# Patient Record
Sex: MALE | Employment: UNEMPLOYED | URBAN - METROPOLITAN AREA
[De-identification: names, ages, dates, MRNs, and addresses within clinical notes are randomized per-mention and may not be internally consistent; named-entity substitution may affect disease eponyms.]

---

## 2021-05-29 ENCOUNTER — HOSPITAL ENCOUNTER (INPATIENT)
Age: 51
LOS: 9 days | Discharge: HOME HEALTH | DRG: 420 | End: 2021-06-07
Attending: STUDENT IN AN ORGANIZED HEALTH CARE EDUCATION/TRAINING PROGRAM | Admitting: INTERNAL MEDICINE
Payer: MEDICAID

## 2021-05-29 DIAGNOSIS — F10.10 ALCOHOL ABUSE: ICD-10-CM

## 2021-05-29 DIAGNOSIS — E16.2 HYPOGLYCEMIA: Primary | ICD-10-CM

## 2021-05-29 DIAGNOSIS — T38.3X1S HYPOGLYCEMIA SECONDARY TO SULFONYLUREA, ACCIDENTAL OR UNINTENTIONAL, SEQUELA: ICD-10-CM

## 2021-05-29 DIAGNOSIS — E16.0 HYPOGLYCEMIA SECONDARY TO SULFONYLUREA, ACCIDENTAL OR UNINTENTIONAL, SEQUELA: ICD-10-CM

## 2021-05-29 LAB
ALBUMIN SERPL-MCNC: 3.8 G/DL (ref 3.4–5)
ALP BLD-CCNC: 136 U/L (ref 40–129)
ALT SERPL-CCNC: 15 U/L (ref 10–40)
ANION GAP SERPL CALCULATED.3IONS-SCNC: 15 MMOL/L (ref 3–16)
AST SERPL-CCNC: 41 U/L (ref 15–37)
BASE EXCESS VENOUS: -2 MMOL/L (ref -3–3)
BASOPHILS ABSOLUTE: 0 K/UL (ref 0–0.2)
BASOPHILS RELATIVE PERCENT: 0.3 %
BILIRUB SERPL-MCNC: <0.2 MG/DL (ref 0–1)
BILIRUBIN DIRECT: <0.2 MG/DL (ref 0–0.3)
BILIRUBIN, INDIRECT: ABNORMAL MG/DL (ref 0–1)
BUN BLDV-MCNC: 8 MG/DL (ref 7–20)
CALCIUM SERPL-MCNC: 8.7 MG/DL (ref 8.3–10.6)
CARBOXYHEMOGLOBIN: 2.3 % (ref 0–1.5)
CHLORIDE BLD-SCNC: 100 MMOL/L (ref 99–110)
CO2: 21 MMOL/L (ref 21–32)
CREAT SERPL-MCNC: 0.5 MG/DL (ref 0.9–1.3)
EOSINOPHILS ABSOLUTE: 0 K/UL (ref 0–0.6)
EOSINOPHILS RELATIVE PERCENT: 0.3 %
ETHANOL: NORMAL MG/DL (ref 0–0.08)
GFR AFRICAN AMERICAN: >60
GFR NON-AFRICAN AMERICAN: >60
GLUCOSE BLD-MCNC: 106 MG/DL (ref 70–99)
GLUCOSE BLD-MCNC: 106 MG/DL (ref 70–99)
GLUCOSE BLD-MCNC: 154 MG/DL (ref 70–99)
GLUCOSE BLD-MCNC: 76 MG/DL (ref 70–99)
GLUCOSE BLD-MCNC: 79 MG/DL (ref 70–99)
GLUCOSE BLD-MCNC: 80 MG/DL (ref 70–99)
GLUCOSE BLD-MCNC: 82 MG/DL (ref 70–99)
GLUCOSE BLD-MCNC: 84 MG/DL (ref 70–99)
GLUCOSE BLD-MCNC: 85 MG/DL (ref 70–99)
GLUCOSE BLD-MCNC: 86 MG/DL (ref 70–99)
GLUCOSE BLD-MCNC: 88 MG/DL (ref 70–99)
GLUCOSE BLD-MCNC: 91 MG/DL (ref 70–99)
GLUCOSE BLD-MCNC: 92 MG/DL (ref 70–99)
GLUCOSE BLD-MCNC: 92 MG/DL (ref 70–99)
HCO3 VENOUS: 23.5 MMOL/L (ref 23–29)
HCT VFR BLD CALC: 37.2 % (ref 40.5–52.5)
HEMOGLOBIN: 12.3 G/DL (ref 13.5–17.5)
LYMPHOCYTES ABSOLUTE: 0.8 K/UL (ref 1–5.1)
LYMPHOCYTES RELATIVE PERCENT: 17.8 %
MCH RBC QN AUTO: 33.4 PG (ref 26–34)
MCHC RBC AUTO-ENTMCNC: 33 G/DL (ref 31–36)
MCV RBC AUTO: 101.3 FL (ref 80–100)
METHEMOGLOBIN VENOUS: 0.5 %
MONOCYTES ABSOLUTE: 0.2 K/UL (ref 0–1.3)
MONOCYTES RELATIVE PERCENT: 5 %
NEUTROPHILS ABSOLUTE: 3.6 K/UL (ref 1.7–7.7)
NEUTROPHILS RELATIVE PERCENT: 76.6 %
O2 CONTENT, VEN: 17 VOL %
O2 SAT, VEN: 99 %
O2 THERAPY: ABNORMAL
PCO2, VEN: 42.1 MMHG (ref 40–50)
PDW BLD-RTO: 12.9 % (ref 12.4–15.4)
PERFORMED ON: ABNORMAL
PERFORMED ON: ABNORMAL
PERFORMED ON: NORMAL
PH VENOUS: 7.36 (ref 7.35–7.45)
PLATELET # BLD: 263 K/UL (ref 135–450)
PMV BLD AUTO: 7.2 FL (ref 5–10.5)
PO2, VEN: 146 MMHG (ref 25–40)
POTASSIUM REFLEX MAGNESIUM: 4.6 MMOL/L (ref 3.5–5.1)
RBC # BLD: 3.67 M/UL (ref 4.2–5.9)
SODIUM BLD-SCNC: 136 MMOL/L (ref 136–145)
TCO2 CALC VENOUS: 56 MMOL/L
TOTAL PROTEIN: 7.4 G/DL (ref 6.4–8.2)
WBC # BLD: 4.7 K/UL (ref 4–11)

## 2021-05-29 PROCEDURE — 2580000003 HC RX 258: Performed by: STUDENT IN AN ORGANIZED HEALTH CARE EDUCATION/TRAINING PROGRAM

## 2021-05-29 PROCEDURE — 2580000003 HC RX 258: Performed by: INTERNAL MEDICINE

## 2021-05-29 PROCEDURE — 36415 COLL VENOUS BLD VENIPUNCTURE: CPT

## 2021-05-29 PROCEDURE — 85025 COMPLETE CBC W/AUTO DIFF WBC: CPT

## 2021-05-29 PROCEDURE — 99284 EMERGENCY DEPT VISIT MOD MDM: CPT

## 2021-05-29 PROCEDURE — 82607 VITAMIN B-12: CPT

## 2021-05-29 PROCEDURE — 2500000003 HC RX 250 WO HCPCS: Performed by: STUDENT IN AN ORGANIZED HEALTH CARE EDUCATION/TRAINING PROGRAM

## 2021-05-29 PROCEDURE — 6360000002 HC RX W HCPCS: Performed by: STUDENT IN AN ORGANIZED HEALTH CARE EDUCATION/TRAINING PROGRAM

## 2021-05-29 PROCEDURE — 82803 BLOOD GASES ANY COMBINATION: CPT

## 2021-05-29 PROCEDURE — 2060000000 HC ICU INTERMEDIATE R&B

## 2021-05-29 PROCEDURE — 83036 HEMOGLOBIN GLYCOSYLATED A1C: CPT

## 2021-05-29 PROCEDURE — 80048 BASIC METABOLIC PNL TOTAL CA: CPT

## 2021-05-29 PROCEDURE — 84439 ASSAY OF FREE THYROXINE: CPT

## 2021-05-29 PROCEDURE — 96365 THER/PROPH/DIAG IV INF INIT: CPT

## 2021-05-29 PROCEDURE — 84443 ASSAY THYROID STIM HORMONE: CPT

## 2021-05-29 PROCEDURE — 82077 ASSAY SPEC XCP UR&BREATH IA: CPT

## 2021-05-29 PROCEDURE — 80076 HEPATIC FUNCTION PANEL: CPT

## 2021-05-29 PROCEDURE — 96375 TX/PRO/DX INJ NEW DRUG ADDON: CPT

## 2021-05-29 PROCEDURE — 82746 ASSAY OF FOLIC ACID SERUM: CPT

## 2021-05-29 PROCEDURE — 6360000002 HC RX W HCPCS: Performed by: INTERNAL MEDICINE

## 2021-05-29 PROCEDURE — 96367 TX/PROPH/DG ADDL SEQ IV INF: CPT

## 2021-05-29 PROCEDURE — 96366 THER/PROPH/DIAG IV INF ADDON: CPT

## 2021-05-29 RX ORDER — LORAZEPAM 2 MG/ML
2 INJECTION INTRAMUSCULAR
Status: DISCONTINUED | OUTPATIENT
Start: 2021-05-29 | End: 2021-06-07 | Stop reason: HOSPADM

## 2021-05-29 RX ORDER — LORAZEPAM 2 MG/ML
4 INJECTION INTRAMUSCULAR
Status: DISCONTINUED | OUTPATIENT
Start: 2021-05-29 | End: 2021-06-07 | Stop reason: HOSPADM

## 2021-05-29 RX ORDER — SODIUM CHLORIDE 0.9 % (FLUSH) 0.9 %
5-40 SYRINGE (ML) INJECTION EVERY 12 HOURS SCHEDULED
Status: DISCONTINUED | OUTPATIENT
Start: 2021-05-29 | End: 2021-06-07 | Stop reason: HOSPADM

## 2021-05-29 RX ORDER — LORAZEPAM 1 MG/1
2 TABLET ORAL
Status: DISCONTINUED | OUTPATIENT
Start: 2021-05-29 | End: 2021-06-07 | Stop reason: HOSPADM

## 2021-05-29 RX ORDER — LORAZEPAM 2 MG/ML
3 INJECTION INTRAMUSCULAR
Status: DISCONTINUED | OUTPATIENT
Start: 2021-05-29 | End: 2021-06-07 | Stop reason: HOSPADM

## 2021-05-29 RX ORDER — SODIUM CHLORIDE 0.9 % (FLUSH) 0.9 %
5-40 SYRINGE (ML) INJECTION PRN
Status: DISCONTINUED | OUTPATIENT
Start: 2021-05-29 | End: 2021-06-07 | Stop reason: HOSPADM

## 2021-05-29 RX ORDER — DEXTROSE MONOHYDRATE 50 MG/ML
100 INJECTION, SOLUTION INTRAVENOUS PRN
Status: DISCONTINUED | OUTPATIENT
Start: 2021-05-29 | End: 2021-06-07 | Stop reason: HOSPADM

## 2021-05-29 RX ORDER — DEXTROSE AND SODIUM CHLORIDE 5; .45 G/100ML; G/100ML
INJECTION, SOLUTION INTRAVENOUS CONTINUOUS
Status: DISCONTINUED | OUTPATIENT
Start: 2021-05-29 | End: 2021-05-30

## 2021-05-29 RX ORDER — ACETAMINOPHEN 650 MG/1
650 SUPPOSITORY RECTAL EVERY 6 HOURS PRN
Status: DISCONTINUED | OUTPATIENT
Start: 2021-05-29 | End: 2021-06-07 | Stop reason: HOSPADM

## 2021-05-29 RX ORDER — LORAZEPAM 1 MG/1
3 TABLET ORAL
Status: DISCONTINUED | OUTPATIENT
Start: 2021-05-29 | End: 2021-06-07 | Stop reason: HOSPADM

## 2021-05-29 RX ORDER — LORAZEPAM 2 MG/ML
1 INJECTION INTRAMUSCULAR
Status: DISCONTINUED | OUTPATIENT
Start: 2021-05-29 | End: 2021-06-07 | Stop reason: HOSPADM

## 2021-05-29 RX ORDER — ONDANSETRON 2 MG/ML
4 INJECTION INTRAMUSCULAR; INTRAVENOUS EVERY 6 HOURS PRN
Status: DISCONTINUED | OUTPATIENT
Start: 2021-05-29 | End: 2021-06-07 | Stop reason: HOSPADM

## 2021-05-29 RX ORDER — ACETAMINOPHEN 325 MG/1
650 TABLET ORAL EVERY 6 HOURS PRN
Status: DISCONTINUED | OUTPATIENT
Start: 2021-05-29 | End: 2021-06-07 | Stop reason: HOSPADM

## 2021-05-29 RX ORDER — SODIUM CHLORIDE 9 MG/ML
25 INJECTION, SOLUTION INTRAVENOUS PRN
Status: DISCONTINUED | OUTPATIENT
Start: 2021-05-29 | End: 2021-06-07 | Stop reason: HOSPADM

## 2021-05-29 RX ORDER — LORAZEPAM 1 MG/1
1 TABLET ORAL
Status: DISCONTINUED | OUTPATIENT
Start: 2021-05-29 | End: 2021-06-07 | Stop reason: HOSPADM

## 2021-05-29 RX ORDER — PROMETHAZINE HYDROCHLORIDE 25 MG/1
12.5 TABLET ORAL EVERY 6 HOURS PRN
Status: DISCONTINUED | OUTPATIENT
Start: 2021-05-29 | End: 2021-06-07 | Stop reason: HOSPADM

## 2021-05-29 RX ORDER — DEXTROSE MONOHYDRATE 25 G/50ML
12.5 INJECTION, SOLUTION INTRAVENOUS PRN
Status: DISCONTINUED | OUTPATIENT
Start: 2021-05-29 | End: 2021-06-07 | Stop reason: HOSPADM

## 2021-05-29 RX ORDER — POLYETHYLENE GLYCOL 3350 17 G/17G
17 POWDER, FOR SOLUTION ORAL DAILY PRN
Status: DISCONTINUED | OUTPATIENT
Start: 2021-05-29 | End: 2021-06-07 | Stop reason: HOSPADM

## 2021-05-29 RX ORDER — NICOTINE POLACRILEX 4 MG
15 LOZENGE BUCCAL PRN
Status: DISCONTINUED | OUTPATIENT
Start: 2021-05-29 | End: 2021-06-07 | Stop reason: HOSPADM

## 2021-05-29 RX ORDER — LORAZEPAM 1 MG/1
4 TABLET ORAL
Status: DISCONTINUED | OUTPATIENT
Start: 2021-05-29 | End: 2021-06-07 | Stop reason: HOSPADM

## 2021-05-29 RX ORDER — LISINOPRIL 10 MG/1
20 TABLET ORAL DAILY
Status: DISCONTINUED | OUTPATIENT
Start: 2021-05-29 | End: 2021-05-31

## 2021-05-29 RX ORDER — FOLIC ACID 5 MG/ML
1 INJECTION, SOLUTION INTRAMUSCULAR; INTRAVENOUS; SUBCUTANEOUS ONCE
Status: COMPLETED | OUTPATIENT
Start: 2021-05-29 | End: 2021-05-29

## 2021-05-29 RX ADMIN — FOLIC ACID 1 MG: 5 INJECTION, SOLUTION INTRAMUSCULAR; INTRAVENOUS; SUBCUTANEOUS at 07:03

## 2021-05-29 RX ADMIN — ENOXAPARIN SODIUM 40 MG: 40 INJECTION SUBCUTANEOUS at 17:30

## 2021-05-29 RX ADMIN — THIAMINE HYDROCHLORIDE 100 MG: 100 INJECTION, SOLUTION INTRAMUSCULAR; INTRAVENOUS at 07:07

## 2021-05-29 RX ADMIN — DEXTROSE AND SODIUM CHLORIDE: 5; 450 INJECTION, SOLUTION INTRAVENOUS at 21:54

## 2021-05-29 RX ADMIN — LORAZEPAM 4 MG: 2 INJECTION INTRAMUSCULAR; INTRAVENOUS at 13:30

## 2021-05-29 RX ADMIN — LORAZEPAM 2 MG: 2 INJECTION INTRAMUSCULAR; INTRAVENOUS at 12:50

## 2021-05-29 RX ADMIN — DEXTROSE AND SODIUM CHLORIDE: 5; 450 INJECTION, SOLUTION INTRAVENOUS at 07:27

## 2021-05-29 RX ADMIN — Medication 10 ML: at 21:56

## 2021-05-29 NOTE — ED PROVIDER NOTES
[facility]      Pt Name: Guido Carbajal  MRN: [id number]  [place] 1970  Date of evaluation: 5/29/2021  Provider: Christopher Kauffman MD    CHIEF COMPLAINT       Chief Complaint   Patient presents with    Hypoglycemia     pt brought by EMS with a BS of 38 pt given 1 amp of dextrose and BS is 138. pt alert to self. pt stated he drank alcohol, doesn't remember anything else       History is provided by patient's friend and caretaker  HISTORY OF PRESENT ILLNESS   (Location/Symptom, Timing/Onset, Context/Setting, Quality, Duration, Modifying Factors, Severity)  Note limiting factors. Guido Carbajal is a 45 y.o. male who presents to the emergency department with hypoglycemia. Patient's friend lives with him and states that he slumped over on the couch unconscious around 5:30 AM.  He and his friend had been sitting on the couch watching television around this time. He had 4 beers prior to this. The caretaker gave the patient his diabetic medications  At 11 PM last night, including Metformin and 10 mg of glyburide. She does note that the patient eats very little at baseline. The patient is denying any pain. He is a very poor historian and history is limited otherwise. Patient is on glyburide 10 mg twice daily per care everywhere as of March 31, 2021. Uses Metformin as well. No insulin use. Admitted for hypoglycemia thought to be secondary to glyburide use just 1 month ago. Nursing Notes were reviewed. REVIEW OF SYSTEMS    (2-9 systems for level 4, 10 or more for level 5)     Review of Systems   Unable to perform ROS: Other       Except as noted above the remainder of the review of systems was reviewed and negative. PAST MEDICAL HISTORY     Past Medical History:   Diagnosis Date    Diabetes mellitus ([facility] [address]          SURGICAL HISTORY     No past surgical history on file.       CURRENT MEDICATIONS       Previous Medications    AMITRIPTYLINE (ELAVIL) 100 MG TABLET    Take 100 mg by mouth nightly    FLUOXETINE (PROZAC) 20 MG CAPSULE    Take 20 mg by mouth daily    GABAPENTIN (NEURONTIN) 100 MG CAPSULE    Take 100 mg by mouth 3 times daily    GLYBURIDE PO    Take by mouth    LISINOPRIL (PRINIVIL;ZESTRIL) 20 MG TABLET    Take 20 mg by mouth daily    METFORMIN (GLUCOPHAGE) 1000 MG TABLET    Take 1,000 mg by mouth daily (with breakfast)    NAPROXEN (NAPROSYN) 500 MG TABLET    Take 1 tablet by mouth 2 times daily for 20 doses    PRAVASTATIN (PRAVACHOL) 40 MG TABLET    Take 40 mg by mouth daily       ALLERGIES     Patient has no known allergies. FAMILY HISTORY     No family history on file. SOCIAL HISTORY       Social History     Socioeconomic History    Marital status: Unknown     Spouse name: Not on file    Number of children: Not on file    Years of education: Not on file    Highest education level: Not on file   Occupational History    Not on file   Tobacco Use    Smoking status: Never Smoker   Substance and Sexual Activity    Alcohol use: Yes     Comment: admits to 7-8 beers every day     Drug use: No    Sexual activity: Not on file   Other Topics Concern    Not on file   Social History Narrative    Not on file     Social Determinants of Health     Financial Resource Strain:     Difficulty of Paying Living Expenses:    Food Insecurity:     Worried About Running Out of Food in the Last Year:     [address] in the Last Year:    Transportation Needs:     Lack of Transportation (Medical):      Lack of Transportation (Non-Medical):    Physical Activity:     Days of Exercise per Week:     Minutes of Exercise per Session:    Stress:     Feeling of Stress :    Social Connections:     Frequency of Communication with Friends and Family:     Frequency of Social Gatherings with Friends and Family:     Attends [employer] Services:     Active Member of Clubs or Organizations:     Attends Club or Organization Meetings:     Marital Status:    Intimate Partner Violence:     Fear of Current or Ex-Partner:     Emotionally Abused:     Physically Abused:     Sexually Abused:        SCREENINGS                        PHYSICAL EXAM    (up to 7 for level 4, 8 or more for level 5)     ED Triage Vitals   BP Temp Temp src Pulse Resp SpO2 Height Weight   -- -- -- -- -- -- -- --       Physical Exam  Constitutional:       Appearance: Normal appearance. HENT:      Head: Normocephalic and atraumatic. Eyes:      General:         Right eye: No discharge. Left eye: No discharge. Conjunctiva/sclera: Conjunctivae normal.   Cardiovascular:      Rate and Rhythm: Regular rhythm. Tachycardia present. Heart sounds: Normal heart sounds. No murmur heard. Pulmonary:      Effort: Pulmonary effort is normal. No respiratory distress. Breath sounds: Normal breath sounds. Abdominal:      General: Abdomen is flat. Bowel sounds are normal.      Palpations: Abdomen is soft. Tenderness: There is no abdominal tenderness. Musculoskeletal:         General: No swelling or tenderness. Right lower leg: No edema. Left lower leg: No edema. Skin:     General: Skin is warm. Capillary Refill: Capillary refill takes less than 2 seconds. Comments: Diaphoretic   Neurological:      Mental Status: He is alert.       Comments: Oriented to person and situation, moves all 4 extremities spontaneously   Psychiatric:         Mood and Affect: Mood normal.         Behavior: Behavior normal.         DIAGNOSTIC RESULTS       RADIOLOGY:   Non-plain film images such as CT, Ultrasound and MRI are read by the radiologist. Plain radiographic images are visualized and preliminarily interpreted by the emergency physician with the below findings:        Interpretation per the Radiologist below, if available at the time of this note:    No orders to display         LABS:  Labs Reviewed   CBC WITH AUTO DIFFERENTIAL - Abnormal; Notable for the following components:       Result Value    RBC 3.67 (*)     Hemoglobin 12.3 (*)     Hematocrit 37.2 (*)     .3 (*)     Lymphocytes Absolute 0.8 (*)     All other components within normal limits    Narrative:     Performed at:  [facility] [address], [facility], [address] [employer]   Phone [phone]   BASIC METABOLIC PANEL W/ REFLEX TO MG FOR LOW K - Abnormal; Notable for the following components:    Glucose 106 (*)     CREATININE 0.5 (*)     All other components within normal limits    Narrative:     Performed at:  [facility] [address], [facility], [address]   Phone [phone]   BLOOD GAS, VENOUS - Abnormal; Notable for the following components:    pO2, Zan 146.0 (*)     Carboxyhemoglobin 2.3 (*)     All other components within normal limits    Narrative:     Performed at:  [facility] [address], [facility], [address]   Phone [phone]   POCT GLUCOSE - Abnormal; Notable for the following components:    POC Glucose 154 (*)     All other components within normal limits    Narrative:     Performed at:  [facility] [address], [facility], [address]   Phone [phone]   POCT GLUCOSE - Abnormal; Notable for the following components:    POC Glucose 106 (*)     All other components within normal limits    Narrative:     Performed at:  [facility] [address], [facility], [address] [employer]   Phone [phone]   ETHANOL    Narrative:     Performed at:  [facility] [address], [facility], [address]   Phone [phone]   POCT GLUCOSE    Narrative:     Performed at:  [facility] [address], [facility], [facility] [address]   Phone [phone]   POCT GLUCOSE   POCT GLUCOSE   POCT GLUCOSE   POCT GLUCOSE   POCT GLUCOSE   POCT GLUCOSE   POCT GLUCOSE   POCT GLUCOSE   POCT GLUCOSE   POCT GLUCOSE   POCT GLUCOSE   POCT GLUCOSE   POCT GLUCOSE   POCT GLUCOSE   POCT GLUCOSE       All other labs were within normal range or not returned as of this dictation. EMERGENCY DEPARTMENT COURSE and DIFFERENTIAL DIAGNOSIS/MDM:   Vitals:    Vitals:    05/29/21 0619 05/29/21 0715 05/29/21 0915 05/29/21 0925   BP:  (!) 150/99 (!) 157/106 (!) 157/96   Pulse:  111 118 115   Resp:  8 15    Temp:   98 °F (36.7 °C)    SpO2: 95% 98% 98%        Medications   dextrose 5 % and 0.45 % sodium chloride infusion ( Intravenous New Bag 5/29/21 0727)   sodium chloride flush 0.9 % injection 5-40 mL (has no administration in time range)   LORazepam (ATIVAN) tablet 1 mg (has no administration in time range)     Or   LORazepam (ATIVAN) injection 1 mg (has no administration in time range)     Or   LORazepam (ATIVAN) tablet 2 mg (has no administration in time range)     Or   LORazepam (ATIVAN) injection 2 mg (has no administration in time range)     Or   LORazepam (ATIVAN) tablet 3 mg (has no administration in time range)     Or   LORazepam (ATIVAN) injection 3 mg (has no administration in time range)     Or   LORazepam (ATIVAN) tablet 4 mg (has no administration in time range)     Or   LORazepam (ATIVAN) injection 4 mg (has no administration in time range)   thiamine (B-1) 100 mg in sodium chloride 0.9 % 100 mL IVPB (0 mg Intravenous Stopped 7/08/32 9346)   folic acid injection 1 mg (1 mg Intravenous Given 5/29/21 0703)       Patient is 65-year-old male on sulfonylurea therapy for type 2 diabetes presenting to the emergency room for syncopal episode. Found to be hypoglycemic to the 30s upon EMS arrival.  On arrival to the emergency room his blood glucose is 138 after 1 amp of D50. He is a poor historian and majority of history is given by his friend who lives with him. She believes last sulfonylurea medication was administered between 11 PM and midnight.   Patient apparently drinks heavily

## 2021-05-29 NOTE — H&P
HOSPITALISTS HISTORY AND PHYSICAL    5/29/2021 1:04 PM    Patient Information:  Pepper Butler is a 45 y.o. male [id number]  PCP:  No primary care provider on file. (Tel: None )    Chief complaint:    Chief Complaint   Patient presents with    Hypoglycemia     pt brought by EMS with a BS of 38 pt given 1 amp of dextrose and BS is 138. pt alert to self. pt stated he drank alcohol, doesn't remember anything else       History of Present Illness:  Carmenza Martell is a 45 y.o. male culture when I see the patient unable to give much history. . 83 patient was brought in with a friend as the patient was slumped over unconscious around 5:30 AM.  Patient has been with his friend watching television around this time. Patient had 4 beers prior to this. Patient did take his diabetic medication include Metformin and glyburide last night. Per friend patient does not eat much at baseline. Patient presented to ED was found to be hypoglycemic. Given amp of D5W and remained hypoglycemia started on D5W drip          REVIEW OF SYSTEMS:   Altered mental status unable to answer questions    Past Medical History:   has a past medical history of Diabetes mellitus ([facility] [address] Past Surgical History:   has no past surgical history on file. Medications:  No current facility-administered medications on file prior to encounter.      Current Outpatient Medications on File Prior to Encounter   Medication Sig Dispense Refill    gabapentin (NEURONTIN) 100 MG capsule Take 100 mg by mouth 3 times daily      lisinopril (PRINIVIL;ZESTRIL) 20 MG tablet Take 20 mg by mouth daily      pravastatin (PRAVACHOL) 40 MG tablet Take 40 mg by mouth daily      FLUoxetine (PROZAC) 20 MG capsule Take 20 mg by mouth daily      amitriptyline (ELAVIL) 100 MG tablet Take 100 mg by mouth nightly      metFORMIN (GLUCOPHAGE) 1000 MG encephalopathy: Secondary to above    Dm2: hold meds for now      DVT prophylaxis lovenox  Code status full code        Admit as inpatient I anticipate hospitalization spanning more than two midnights for investigation and treatment of the above medically necessary diagnoses. Please note that some part of this chart was generated using Dragon dictation software. Although every effort was made to ensure the accuracy of this automated transcription, some errors in transcription may have occurred inadvertently. If you may need any clarification, please do not hesitate to contact me through [place] [phone].        Caroline Boone MD    5/29/2021 1:04 PM

## 2021-05-30 ENCOUNTER — APPOINTMENT (OUTPATIENT)
Dept: GENERAL RADIOLOGY | Age: 51
DRG: 420 | End: 2021-05-30
Payer: MEDICAID

## 2021-05-30 LAB
ANION GAP SERPL CALCULATED.3IONS-SCNC: 12 MMOL/L (ref 3–16)
BASOPHILS ABSOLUTE: 0 K/UL (ref 0–0.2)
BASOPHILS RELATIVE PERCENT: 0.2 %
BILIRUBIN URINE: NEGATIVE
BLOOD, URINE: NEGATIVE
BUN BLDV-MCNC: 8 MG/DL (ref 7–20)
CALCIUM SERPL-MCNC: 8.1 MG/DL (ref 8.3–10.6)
CHLORIDE BLD-SCNC: 100 MMOL/L (ref 99–110)
CLARITY: CLEAR
CO2: 23 MMOL/L (ref 21–32)
COLOR: YELLOW
CREAT SERPL-MCNC: 0.6 MG/DL (ref 0.9–1.3)
EOSINOPHILS ABSOLUTE: 0 K/UL (ref 0–0.6)
EOSINOPHILS RELATIVE PERCENT: 0.1 %
ESTIMATED AVERAGE GLUCOSE: 139.9 MG/DL
FERRITIN: 436.2 NG/ML (ref 30–400)
FOLATE: >20 NG/ML (ref 4.78–24.2)
GFR AFRICAN AMERICAN: >60
GFR NON-AFRICAN AMERICAN: >60
GLUCOSE BLD-MCNC: 106 MG/DL (ref 70–99)
GLUCOSE BLD-MCNC: 109 MG/DL (ref 70–99)
GLUCOSE BLD-MCNC: 114 MG/DL (ref 70–99)
GLUCOSE BLD-MCNC: 115 MG/DL (ref 70–99)
GLUCOSE BLD-MCNC: 123 MG/DL (ref 70–99)
GLUCOSE BLD-MCNC: 138 MG/DL (ref 70–99)
GLUCOSE BLD-MCNC: 147 MG/DL (ref 70–99)
GLUCOSE BLD-MCNC: 156 MG/DL (ref 70–99)
GLUCOSE BLD-MCNC: 219 MG/DL (ref 70–99)
GLUCOSE URINE: NEGATIVE MG/DL
HBA1C MFR BLD: 6.5 %
HCT VFR BLD CALC: 34.3 % (ref 40.5–52.5)
HEMOGLOBIN: 11.7 G/DL (ref 13.5–17.5)
IRON SATURATION: 8 % (ref 20–50)
IRON: 25 UG/DL (ref 59–158)
KETONES, URINE: NEGATIVE MG/DL
LACTIC ACID: 3.5 MMOL/L (ref 0.4–2)
LEUKOCYTE ESTERASE, URINE: NEGATIVE
LYMPHOCYTES ABSOLUTE: 1.1 K/UL (ref 1–5.1)
LYMPHOCYTES RELATIVE PERCENT: 5.6 %
MCH RBC QN AUTO: 33.9 PG (ref 26–34)
MCHC RBC AUTO-ENTMCNC: 34.2 G/DL (ref 31–36)
MCV RBC AUTO: 98.9 FL (ref 80–100)
MICROSCOPIC EXAMINATION: NORMAL
MONOCYTES ABSOLUTE: 0.6 K/UL (ref 0–1.3)
MONOCYTES RELATIVE PERCENT: 3 %
NEUTROPHILS ABSOLUTE: 17.9 K/UL (ref 1.7–7.7)
NEUTROPHILS RELATIVE PERCENT: 91.1 %
NITRITE, URINE: NEGATIVE
PDW BLD-RTO: 12.8 % (ref 12.4–15.4)
PERFORMED ON: ABNORMAL
PH UA: 5.5 (ref 5–8)
PLATELET # BLD: 237 K/UL (ref 135–450)
PMV BLD AUTO: 7.6 FL (ref 5–10.5)
POTASSIUM REFLEX MAGNESIUM: 3.9 MMOL/L (ref 3.5–5.1)
PROCALCITONIN: 0.49 NG/ML (ref 0–0.15)
PROTEIN UA: NEGATIVE MG/DL
RBC # BLD: 3.47 M/UL (ref 4.2–5.9)
SODIUM BLD-SCNC: 135 MMOL/L (ref 136–145)
SPECIFIC GRAVITY UA: 1.01 (ref 1–1.03)
T4 FREE: 0.8 NG/DL (ref 0.9–1.8)
TOTAL IRON BINDING CAPACITY: 324 UG/DL (ref 260–445)
TSH REFLEX: 5.89 UIU/ML (ref 0.27–4.2)
URINE REFLEX TO CULTURE: NORMAL
URINE TYPE: NORMAL
UROBILINOGEN, URINE: 0.2 E.U./DL
VITAMIN B-12: 254 PG/ML (ref 211–911)
WBC # BLD: 19.7 K/UL (ref 4–11)

## 2021-05-30 PROCEDURE — 71045 X-RAY EXAM CHEST 1 VIEW: CPT

## 2021-05-30 PROCEDURE — 92610 EVALUATE SWALLOWING FUNCTION: CPT

## 2021-05-30 PROCEDURE — 83540 ASSAY OF IRON: CPT

## 2021-05-30 PROCEDURE — 80048 BASIC METABOLIC PNL TOTAL CA: CPT

## 2021-05-30 PROCEDURE — 81003 URINALYSIS AUTO W/O SCOPE: CPT

## 2021-05-30 PROCEDURE — 2580000003 HC RX 258: Performed by: STUDENT IN AN ORGANIZED HEALTH CARE EDUCATION/TRAINING PROGRAM

## 2021-05-30 PROCEDURE — 2580000003 HC RX 258: Performed by: INTERNAL MEDICINE

## 2021-05-30 PROCEDURE — 2060000000 HC ICU INTERMEDIATE R&B

## 2021-05-30 PROCEDURE — 6360000002 HC RX W HCPCS: Performed by: INTERNAL MEDICINE

## 2021-05-30 PROCEDURE — 92526 ORAL FUNCTION THERAPY: CPT

## 2021-05-30 PROCEDURE — 82728 ASSAY OF FERRITIN: CPT

## 2021-05-30 PROCEDURE — 85025 COMPLETE CBC W/AUTO DIFF WBC: CPT

## 2021-05-30 PROCEDURE — 83605 ASSAY OF LACTIC ACID: CPT

## 2021-05-30 PROCEDURE — 84145 PROCALCITONIN (PCT): CPT

## 2021-05-30 PROCEDURE — 87040 BLOOD CULTURE FOR BACTERIA: CPT

## 2021-05-30 PROCEDURE — 6370000000 HC RX 637 (ALT 250 FOR IP): Performed by: INTERNAL MEDICINE

## 2021-05-30 PROCEDURE — 6360000002 HC RX W HCPCS: Performed by: STUDENT IN AN ORGANIZED HEALTH CARE EDUCATION/TRAINING PROGRAM

## 2021-05-30 PROCEDURE — 2500000003 HC RX 250 WO HCPCS: Performed by: INTERNAL MEDICINE

## 2021-05-30 PROCEDURE — 83550 IRON BINDING TEST: CPT

## 2021-05-30 PROCEDURE — 36415 COLL VENOUS BLD VENIPUNCTURE: CPT

## 2021-05-30 RX ORDER — PAROXETINE HYDROCHLORIDE 20 MG/1
20 TABLET, FILM COATED ORAL EVERY MORNING
Status: ON HOLD | COMMUNITY
End: 2021-06-07 | Stop reason: HOSPADM

## 2021-05-30 RX ORDER — GLIPIZIDE 10 MG/1
10 TABLET ORAL 2 TIMES DAILY
Status: ON HOLD | COMMUNITY
Start: 2021-03-03 | End: 2021-06-07 | Stop reason: HOSPADM

## 2021-05-30 RX ORDER — PRAVASTATIN SODIUM 40 MG
40 TABLET ORAL NIGHTLY
Status: ON HOLD | COMMUNITY
Start: 2021-03-03 | End: 2021-06-07 | Stop reason: HOSPADM

## 2021-05-30 RX ORDER — GABAPENTIN 300 MG/1
1 CAPSULE ORAL 2 TIMES DAILY
Status: ON HOLD | COMMUNITY
Start: 2021-03-03 | End: 2021-06-07 | Stop reason: HOSPADM

## 2021-05-30 RX ORDER — CYANOCOBALAMIN 1000 UG/ML
1000 INJECTION INTRAMUSCULAR; SUBCUTANEOUS ONCE
Status: COMPLETED | OUTPATIENT
Start: 2021-05-30 | End: 2021-05-30

## 2021-05-30 RX ORDER — SODIUM CHLORIDE, SODIUM LACTATE, POTASSIUM CHLORIDE, CALCIUM CHLORIDE 600; 310; 30; 20 MG/100ML; MG/100ML; MG/100ML; MG/100ML
INJECTION, SOLUTION INTRAVENOUS CONTINUOUS
Status: DISCONTINUED | OUTPATIENT
Start: 2021-05-30 | End: 2021-06-01

## 2021-05-30 RX ORDER — AMITRIPTYLINE HYDROCHLORIDE 100 MG/1
100 TABLET, FILM COATED ORAL NIGHTLY
Status: ON HOLD | COMMUNITY
Start: 2021-03-03 | End: 2021-06-07 | Stop reason: HOSPADM

## 2021-05-30 RX ADMIN — LORAZEPAM 4 MG: 2 INJECTION INTRAMUSCULAR; INTRAVENOUS at 12:45

## 2021-05-30 RX ADMIN — CYANOCOBALAMIN 1000 MCG: 1000 INJECTION, SOLUTION INTRAMUSCULAR; SUBCUTANEOUS at 10:08

## 2021-05-30 RX ADMIN — LORAZEPAM 2 MG: 2 INJECTION INTRAMUSCULAR; INTRAVENOUS at 14:25

## 2021-05-30 RX ADMIN — Medication 10 ML: at 21:25

## 2021-05-30 RX ADMIN — SODIUM CHLORIDE 1500 MG: 900 INJECTION INTRAVENOUS at 10:11

## 2021-05-30 RX ADMIN — LORAZEPAM 2 MG: 2 INJECTION INTRAMUSCULAR; INTRAVENOUS at 09:17

## 2021-05-30 RX ADMIN — LORAZEPAM 3 MG: 2 INJECTION INTRAMUSCULAR; INTRAVENOUS at 11:23

## 2021-05-30 RX ADMIN — LISINOPRIL 20 MG: 10 TABLET ORAL at 08:34

## 2021-05-30 RX ADMIN — LORAZEPAM 4 MG: 2 INJECTION INTRAMUSCULAR; INTRAVENOUS at 17:20

## 2021-05-30 RX ADMIN — ENOXAPARIN SODIUM 40 MG: 40 INJECTION SUBCUTANEOUS at 08:34

## 2021-05-30 RX ADMIN — LORAZEPAM 4 MG: 2 INJECTION INTRAMUSCULAR; INTRAVENOUS at 18:31

## 2021-05-30 RX ADMIN — SODIUM CHLORIDE, POTASSIUM CHLORIDE, SODIUM LACTATE AND CALCIUM CHLORIDE: 600; 310; 30; 20 INJECTION, SOLUTION INTRAVENOUS at 15:17

## 2021-05-30 RX ADMIN — ACETAMINOPHEN 650 MG: 325 TABLET ORAL at 17:24

## 2021-05-30 RX ADMIN — SODIUM CHLORIDE 1500 MG: 900 INJECTION INTRAVENOUS at 15:20

## 2021-05-30 RX ADMIN — LORAZEPAM 1 MG: 2 INJECTION INTRAMUSCULAR; INTRAVENOUS at 21:38

## 2021-05-30 RX ADMIN — SODIUM CHLORIDE 1500 MG: 900 INJECTION INTRAVENOUS at 21:32

## 2021-05-30 RX ADMIN — DEXTROSE AND SODIUM CHLORIDE: 5; 450 INJECTION, SOLUTION INTRAVENOUS at 04:37

## 2021-05-30 RX ADMIN — FOLIC ACID: 5 INJECTION, SOLUTION INTRAMUSCULAR; INTRAVENOUS; SUBCUTANEOUS at 10:31

## 2021-05-30 RX ADMIN — Medication 10 ML: at 09:18

## 2021-05-30 ASSESSMENT — PAIN DESCRIPTION - FREQUENCY
FREQUENCY: INTERMITTENT
FREQUENCY: INTERMITTENT

## 2021-05-30 ASSESSMENT — PAIN SCALES - GENERAL
PAINLEVEL_OUTOF10: 6
PAINLEVEL_OUTOF10: 3

## 2021-05-30 ASSESSMENT — PAIN DESCRIPTION - ONSET
ONSET: ON-GOING
ONSET: ON-GOING

## 2021-05-30 ASSESSMENT — PAIN DESCRIPTION - PROGRESSION
CLINICAL_PROGRESSION: NOT CHANGED
CLINICAL_PROGRESSION: NOT CHANGED

## 2021-05-30 ASSESSMENT — PAIN DESCRIPTION - DESCRIPTORS
DESCRIPTORS: ACHING;DULL
DESCRIPTORS: ACHING

## 2021-05-30 ASSESSMENT — PAIN - FUNCTIONAL ASSESSMENT
PAIN_FUNCTIONAL_ASSESSMENT: ACTIVITIES ARE NOT PREVENTED
PAIN_FUNCTIONAL_ASSESSMENT: ACTIVITIES ARE NOT PREVENTED

## 2021-05-30 ASSESSMENT — PAIN DESCRIPTION - LOCATION
LOCATION: HEAD
LOCATION: HEAD

## 2021-05-30 ASSESSMENT — PAIN DESCRIPTION - PAIN TYPE
TYPE: ACUTE PAIN
TYPE: ACUTE PAIN

## 2021-05-30 NOTE — PROGRESS NOTES
Patient was unsure about his current list of medication. Patient provided a cell phone number to call and get medication list. The person was patient's friend Avinash Zuleyka ([phone]). Med rec completed.

## 2021-05-30 NOTE — PROGRESS NOTES
Patient has a visitor present in the room stating that she is patient's roommate. She is demanding medical information about patient. Patient has no emergency contact listed. Informed her that no medical information can be revealed for this reason. She demanded to be added as an Emergency Contact for the patient. Educated her that patient needs to be alert and oriented to consent to that. Visitor visibly upset stating that she has written, legal document stating that she is a legal Emergency Contact for the patient. Visitor provided RN with a document which was a handwritten letter stating that patient had agreed to pay $100 every month for room and board to the visitor. When informed visitor that this is not an acceptable form of legal document to prove that we can release medical information to her, she started crying and yelling loudly. Case management notified but was not able to come to room at this time. Visitor became more upset. Security was called to escort the visitor off the premises.

## 2021-05-30 NOTE — PROGRESS NOTES
Facility/Department: [address]  Initial Assessment  DYSPHAGIA BEDSIDE SWALLOW EVALUATION     Patient: Shanta Martell   : 1970   MRN: [id number]      Evaluation Date: 2021   Admitting Diagnosis: Hypoglycemia [E16.2]  Pain: Pt did not report pain                        H&P: Shanta Martell is a 45 y.o. male culture when I see the patient unable to give much history. . 83 patient was brought in with a friend as the patient was slumped over unconscious around 5:30 AM.  Patient has been with his friend watching television around this time. Patient had 4 beers prior to this. Patient did take his diabetic medication include Metformin and glyburide last night. Per friend patient does not eat much at baseline. Patient presented to ED was found to be hypoglycemic. Given amp of D5W and remained hypoglycemia started on D5W drip    History/Prior Level of Function:   Living Status: Per chart Pt lives with a roommate     Prior Dysphagia History: No dysphagia history reported     Dysphagia Impressions/Diagnosis: Mild-Moderate Oropharyngeal Dysphagia   Pt was seen sitting upright in bed,  services were used as Pt is Kinyarwanda speaking. RN reported no concerns with swallowing. Pt did appear somewhat confused during evaluation with frequent clarification requested by . Pt reported he \"occasionally has difficulty swallowing liquids\" and has missing teeth which contributes to difficulty biting into foods and chewing. Various textures were provided to assess swallow function. Pt presents with mild-moderate oropharyngeal dysphagia characterized by prolonged mastication, suspected premature bolus loss to pharynx, delayed swallow initiation, and reduced laryngeal elevation upon manual palpation. Pt tolerated thin liquids via cup with no overt clinical s/s of aspiration/penetration, however delayed swallow initiation was noted.  Given soft and regular solid trials (eggs and anthony) prolonged mastication was noted with Pt requiring verbal cues to clear anthony from oral cavity. Pt still chewing piece of anthony after 3-4 minutes, verbal cues were required to expel or try to swallowing Pt indicated he could swallow the bolus. Improved and more timely mastication was noted with softer solids. Recommend diet downgrade with ongoing use of aspiration precautions to reduce overall risk. Recommended Diet and Intervention 5/30/2021:  Diet Solids Recommendation:  Dysphagia II Minced and Moist   Liquid Consistency Recommendation: Thin liquids    Recommended form of Meds:  Meds in puree      Compensatory Swallowing Strategies: Alternate solids/liquids , Upright as possible with all PO intake , Assist Feed , Small bites/sips     SHORT TERM DYSPHAGIA GOALS/PLAN OF CARE: Speech therapy for dysphagia tx 3-5 times per week during acute care stay. 1. Pt will functionally tolerate recommended diet with no overt clinical s/s of aspiration  2. Pt will demonstrate understanding of aspiration risk and precautions via education/demonstration with occasional prompting  3. Pt will advance to least restrictive diet as indicated   4.  If clinical s/s of aspiration/penetration continue to be noted, Pt will participate in Modified Barium Swallow Study     Dysphagia Therapeutic Intervention:  Diet Tolerance Monitoring , Patient/Family Education , Therapeutic Trials with SLP     Discharge Recommendations: Recommend ongoing speech therapy for dysphagia therapy upon discharge from hospital    Patient Positioning: Upright in bed    Current Diet Level (prior to evaluation): Regular texture carb control diet/thin liquids    Respiratory Status:   [x]Room Air   []O2 via nasal cannula   []Other:    Dentition:  []Adequate  []Dentures   [x]Missing Many Teeth  []Edentulous  []Other:    Baseline Vocal Quality:  []Normal  []Dysphonic   []Aphonic   []Hoarse  []Wet  [x]Weak  []Other:    Volitional

## 2021-05-30 NOTE — PLAN OF CARE
Problem: Pain:  Goal: Pain level will decrease  Description: Pain level will decrease  Outcome: Ongoing     Problem: Pain:  Goal: Control of acute pain  Description: Control of acute pain  Outcome: Ongoing     Problem: Pain:  Goal: Control of chronic pain  Description: Control of chronic pain  Outcome: Ongoing     Problem: Skin Integrity:  Goal: Will show no infection signs and symptoms  Description: Will show no infection signs and symptoms  5/30/2021 1040 by Cabrera Herrera RN  Outcome: Ongoing     Problem: Falls - Risk of:  Goal: Will remain free from falls  Description: Will remain free from falls  5/30/2021 1040 by Cabrera Herrera RN  Outcome: Ongoing

## 2021-05-30 NOTE — PROGRESS NOTES
Shift assessment complete, Pt still not alert, only opens eyes to voice/agitation. Unable to give PO meds tonight. Pt is here for hypoglycemia. Glucose has been stable on my shift. Q2 hr checks. Pt was given 2mg of ativan then 4mg within 40 minutes of each other in the ED. Pt has been sleeping, and barley opens eyes to voice/agitation, not answering questions. No family at bedside. BP starting to trend up, due to pt not being able to take BP meds, and is tachycardiac, other vitals stable. Unable to complete admission at this time. Pt has been placed in seizure precautions, camera and bed alarm in place. No other concerns, will continue to monitor.

## 2021-05-30 NOTE — PROGRESS NOTES
Pt beginning to wake up, is alert to only self. Has spoken a few words but only in Sami, unable to use the language line with him at this time due to his confusion and drowsiness. Will attempt when pt is more alert.

## 2021-05-30 NOTE — PROGRESS NOTES
Pt's BP trending up. Pt had his scheduled Lisinopril this AM per MAR. Last /92 at 12:40 PM. Pt has no PRN antihypertensive medication. Messaged Hospitalist via [address] [place] [address]. Per Hospitalist, would add agent if Systolic BP at 556J. WCTM.

## 2021-05-30 NOTE — PROGRESS NOTES
[facility]    5/30/2021 12:02 PM        Name: Denzel Card . Admitted: 5/29/2021  Primary Care Provider: No primary care provider on file. (Tel: None)      Subjective:     sepsis overnight white count elevated chest x-ray with likely aspiration pneumonia.   Patient DTs requiring IV Ativan    Reviewed interval ancillary notes    Current Medications  ampicillin-sulbactam (UNASYN) 1500 mg IVPB minibag, Q6H  dextrose 5 % and 0.45 % sodium chloride infusion, Continuous  sodium chloride flush 0.9 % injection 5-40 mL, PRN  LORazepam (ATIVAN) tablet 1 mg, Q1H PRN   Or  LORazepam (ATIVAN) injection 1 mg, Q1H PRN   Or  LORazepam (ATIVAN) tablet 2 mg, Q1H PRN   Or  LORazepam (ATIVAN) injection 2 mg, Q1H PRN   Or  LORazepam (ATIVAN) tablet 3 mg, Q1H PRN   Or  LORazepam (ATIVAN) injection 3 mg, Q1H PRN   Or  LORazepam (ATIVAN) tablet 4 mg, Q1H PRN   Or  LORazepam (ATIVAN) injection 4 mg, Q1H PRN  lisinopril (PRINIVIL;ZESTRIL) tablet 20 mg, Daily  sodium chloride flush 0.9 % injection 5-40 mL, 2 times per day  sodium chloride flush 0.9 % injection 5-40 mL, PRN  0.9 % sodium chloride infusion, PRN  enoxaparin (LOVENOX) injection 40 mg, Daily  promethazine (PHENERGAN) tablet 12.5 mg, Q6H PRN   Or  ondansetron (ZOFRAN) injection 4 mg, Q6H PRN  polyethylene glycol (GLYCOLAX) packet 17 g, Daily PRN  acetaminophen (TYLENOL) tablet 650 mg, Q6H PRN   Or  acetaminophen (TYLENOL) suppository 650 mg, Q6H PRN  sodium chloride 0.9 % 207 mL with folic acid 1 mg, adult multi-vitamin with vitamin k 10 mL, thiamine 100 mg, Daily  glucose (GLUTOSE) 40 % oral gel 15 g, PRN  dextrose 50 % IV solution, PRN  glucagon (rDNA) injection 1 mg, PRN  dextrose 5 % solution, PRN        Objective:  BP (!) 149/88   Pulse 120   Temp 98.6 °F (37 °C) (Axillary)   Resp 16   SpO2 96%     Intake/Output Summary (Last 24 hours) at 5/30/2021 [address] [place] filed at 5/30/2021 1003  Gross per 24 hour   Intake 3824.35 ml   Output 625 ml   Net 3199.35 ml      Wt Readings from Last 3 Encounters:   No data found for Wt       General appearance:  Appears comfortable. AA only oriented to self today  HEENT: atraumatic, Pupils equal, muscous membranes moist, no masses appreciated  Cardiovascular: tachycardic reg rhythm no murmurs appreciated   Respiratory: CTAB no wheezing  Gastrointestinal: Abdomen soft, non-tender, BS+  EXT: no edema  Neurology: no gross focal deficts, bilateral arm tremors  Psychiatry: Appropriate affect. Not agitated  Skin: Warm, dry, no rashes appreciated    Labs and Tests:  CBC:   Recent Labs     05/29/21  0652 05/30/21  0438   WBC 4.7 19.7*   HGB 12.3* 11.7*    237     BMP:    Recent Labs     05/29/21  0652 05/30/21  0438    135*   K 4.6 3.9    100   CO2 21 23   BUN 8 8   CREATININE 0.5* 0.6*   GLUCOSE 106* 114*     Hepatic:   Recent Labs     05/29/21  0652   AST 41*   ALT 15   BILITOT <0.2   ALKPHOS 136*     XR CHEST PORTABLE   Final Result   Bilateral lower lung zone pneumonia more severe on the left             Recent imaging reviewed    Problem List  Active Problems:    Hypoglycemia  Resolved Problems:    * No resolved hospital problems.  *         Assessment/Plan:   Hypoglycemia  - improved stop d5w  - a1c 6.5 dc dm meds likely dc on metformin only    etoh withdrawal  - CIWA scale ativan as needed iv  - banana bag     Sepsis secondary to aspiration pna ( not POA) tachycardia elevated wbc  - start unasyn  - speech on board dysphagia 2 diet    Acute metabolic encephalopathy: Secondary to above           DVT prophylaxis lovenox  Code status full code      Sangeeta Leong MD   5/30/2021 12:02 PM

## 2021-05-31 LAB
ANION GAP SERPL CALCULATED.3IONS-SCNC: 10 MMOL/L (ref 3–16)
BASOPHILS ABSOLUTE: 0 K/UL (ref 0–0.2)
BASOPHILS RELATIVE PERCENT: 0.2 %
BUN BLDV-MCNC: 9 MG/DL (ref 7–20)
CALCIUM SERPL-MCNC: 8.4 MG/DL (ref 8.3–10.6)
CHLORIDE BLD-SCNC: 108 MMOL/L (ref 99–110)
CO2: 27 MMOL/L (ref 21–32)
CREAT SERPL-MCNC: 0.6 MG/DL (ref 0.9–1.3)
EOSINOPHILS ABSOLUTE: 0.1 K/UL (ref 0–0.6)
EOSINOPHILS RELATIVE PERCENT: 0.7 %
GFR AFRICAN AMERICAN: >60
GFR NON-AFRICAN AMERICAN: >60
GLUCOSE BLD-MCNC: 127 MG/DL (ref 70–99)
GLUCOSE BLD-MCNC: 129 MG/DL (ref 70–99)
GLUCOSE BLD-MCNC: 170 MG/DL (ref 70–99)
GLUCOSE BLD-MCNC: 208 MG/DL (ref 70–99)
GLUCOSE BLD-MCNC: 99 MG/DL (ref 70–99)
HCT VFR BLD CALC: 34.5 % (ref 40.5–52.5)
HEMOGLOBIN: 11.8 G/DL (ref 13.5–17.5)
LYMPHOCYTES ABSOLUTE: 0.9 K/UL (ref 1–5.1)
LYMPHOCYTES RELATIVE PERCENT: 6.6 %
MAGNESIUM: 1.4 MG/DL (ref 1.8–2.4)
MCH RBC QN AUTO: 33.8 PG (ref 26–34)
MCHC RBC AUTO-ENTMCNC: 34.2 G/DL (ref 31–36)
MCV RBC AUTO: 98.9 FL (ref 80–100)
MONOCYTES ABSOLUTE: 0.6 K/UL (ref 0–1.3)
MONOCYTES RELATIVE PERCENT: 4.2 %
NEUTROPHILS ABSOLUTE: 12.5 K/UL (ref 1.7–7.7)
NEUTROPHILS RELATIVE PERCENT: 88.3 %
PDW BLD-RTO: 12.9 % (ref 12.4–15.4)
PERFORMED ON: ABNORMAL
PERFORMED ON: NORMAL
PLATELET # BLD: 209 K/UL (ref 135–450)
PMV BLD AUTO: 7.5 FL (ref 5–10.5)
POTASSIUM REFLEX MAGNESIUM: 3.3 MMOL/L (ref 3.5–5.1)
POTASSIUM SERPL-SCNC: 3.4 MMOL/L (ref 3.5–5.1)
RBC # BLD: 3.49 M/UL (ref 4.2–5.9)
SODIUM BLD-SCNC: 145 MMOL/L (ref 136–145)
WBC # BLD: 14.2 K/UL (ref 4–11)

## 2021-05-31 PROCEDURE — 6360000002 HC RX W HCPCS: Performed by: STUDENT IN AN ORGANIZED HEALTH CARE EDUCATION/TRAINING PROGRAM

## 2021-05-31 PROCEDURE — 2500000003 HC RX 250 WO HCPCS: Performed by: INTERNAL MEDICINE

## 2021-05-31 PROCEDURE — 6370000000 HC RX 637 (ALT 250 FOR IP): Performed by: INTERNAL MEDICINE

## 2021-05-31 PROCEDURE — 2060000000 HC ICU INTERMEDIATE R&B

## 2021-05-31 PROCEDURE — 6360000002 HC RX W HCPCS: Performed by: INTERNAL MEDICINE

## 2021-05-31 PROCEDURE — 83735 ASSAY OF MAGNESIUM: CPT

## 2021-05-31 PROCEDURE — 80048 BASIC METABOLIC PNL TOTAL CA: CPT

## 2021-05-31 PROCEDURE — 36415 COLL VENOUS BLD VENIPUNCTURE: CPT

## 2021-05-31 PROCEDURE — 84132 ASSAY OF SERUM POTASSIUM: CPT

## 2021-05-31 PROCEDURE — 85025 COMPLETE CBC W/AUTO DIFF WBC: CPT

## 2021-05-31 PROCEDURE — 2580000003 HC RX 258: Performed by: INTERNAL MEDICINE

## 2021-05-31 RX ORDER — MAGNESIUM SULFATE IN WATER 40 MG/ML
4000 INJECTION, SOLUTION INTRAVENOUS ONCE
Status: COMPLETED | OUTPATIENT
Start: 2021-05-31 | End: 2021-05-31

## 2021-05-31 RX ORDER — LABETALOL HYDROCHLORIDE 5 MG/ML
10 INJECTION, SOLUTION INTRAVENOUS EVERY 6 HOURS PRN
Status: DISCONTINUED | OUTPATIENT
Start: 2021-05-31 | End: 2021-06-07 | Stop reason: HOSPADM

## 2021-05-31 RX ORDER — LANOLIN ALCOHOL/MO/W.PET/CERES
500 CREAM (GRAM) TOPICAL DAILY
Status: DISCONTINUED | OUTPATIENT
Start: 2021-05-31 | End: 2021-06-07 | Stop reason: HOSPADM

## 2021-05-31 RX ORDER — LISINOPRIL 20 MG/1
40 TABLET ORAL DAILY
Status: DISCONTINUED | OUTPATIENT
Start: 2021-06-01 | End: 2021-06-02

## 2021-05-31 RX ORDER — POTASSIUM CHLORIDE 7.45 MG/ML
10 INJECTION INTRAVENOUS
Status: COMPLETED | OUTPATIENT
Start: 2021-05-31 | End: 2021-05-31

## 2021-05-31 RX ORDER — FERROUS SULFATE 325(65) MG
325 TABLET ORAL 2 TIMES DAILY WITH MEALS
Status: DISCONTINUED | OUTPATIENT
Start: 2021-05-31 | End: 2021-06-07 | Stop reason: HOSPADM

## 2021-05-31 RX ADMIN — LORAZEPAM 2 MG: 2 INJECTION INTRAMUSCULAR; INTRAVENOUS at 00:36

## 2021-05-31 RX ADMIN — SODIUM CHLORIDE 1500 MG: 900 INJECTION INTRAVENOUS at 10:11

## 2021-05-31 RX ADMIN — MAGNESIUM SULFATE HEPTAHYDRATE 4000 MG: 40 INJECTION, SOLUTION INTRAVENOUS at 10:56

## 2021-05-31 RX ADMIN — SODIUM CHLORIDE, POTASSIUM CHLORIDE, SODIUM LACTATE AND CALCIUM CHLORIDE: 600; 310; 30; 20 INJECTION, SOLUTION INTRAVENOUS at 00:38

## 2021-05-31 RX ADMIN — LORAZEPAM 3 MG: 2 INJECTION INTRAMUSCULAR; INTRAVENOUS at 23:33

## 2021-05-31 RX ADMIN — POTASSIUM CHLORIDE 10 MEQ: 7.46 INJECTION, SOLUTION INTRAVENOUS at 11:00

## 2021-05-31 RX ADMIN — LORAZEPAM 3 MG: 2 INJECTION INTRAMUSCULAR; INTRAVENOUS at 16:37

## 2021-05-31 RX ADMIN — LABETALOL HYDROCHLORIDE 10 MG: 5 INJECTION INTRAVENOUS at 10:04

## 2021-05-31 RX ADMIN — SODIUM CHLORIDE, POTASSIUM CHLORIDE, SODIUM LACTATE AND CALCIUM CHLORIDE: 600; 310; 30; 20 INJECTION, SOLUTION INTRAVENOUS at 21:42

## 2021-05-31 RX ADMIN — POTASSIUM CHLORIDE 10 MEQ: 7.46 INJECTION, SOLUTION INTRAVENOUS at 13:37

## 2021-05-31 RX ADMIN — POTASSIUM CHLORIDE 10 MEQ: 7.46 INJECTION, SOLUTION INTRAVENOUS at 11:11

## 2021-05-31 RX ADMIN — LABETALOL HYDROCHLORIDE 10 MG: 5 INJECTION INTRAVENOUS at 21:40

## 2021-05-31 RX ADMIN — LORAZEPAM 4 MG: 2 INJECTION INTRAMUSCULAR; INTRAVENOUS at 06:42

## 2021-05-31 RX ADMIN — LORAZEPAM 4 MG: 2 INJECTION INTRAMUSCULAR; INTRAVENOUS at 02:28

## 2021-05-31 RX ADMIN — LORAZEPAM 4 MG: 2 INJECTION INTRAMUSCULAR; INTRAVENOUS at 21:38

## 2021-05-31 RX ADMIN — SODIUM CHLORIDE 1500 MG: 900 INJECTION INTRAVENOUS at 21:37

## 2021-05-31 RX ADMIN — LORAZEPAM 4 MG: 2 INJECTION INTRAMUSCULAR; INTRAVENOUS at 08:48

## 2021-05-31 RX ADMIN — Medication 10 ML: at 08:49

## 2021-05-31 RX ADMIN — LORAZEPAM 3 MG: 2 INJECTION INTRAMUSCULAR; INTRAVENOUS at 13:18

## 2021-05-31 RX ADMIN — SODIUM CHLORIDE 1500 MG: 900 INJECTION INTRAVENOUS at 15:36

## 2021-05-31 RX ADMIN — POTASSIUM CHLORIDE 10 MEQ: 7.46 INJECTION, SOLUTION INTRAVENOUS at 15:33

## 2021-05-31 RX ADMIN — Medication 10 ML: at 21:40

## 2021-05-31 RX ADMIN — FOLIC ACID: 5 INJECTION, SOLUTION INTRAMUSCULAR; INTRAVENOUS; SUBCUTANEOUS at 08:56

## 2021-05-31 RX ADMIN — ENOXAPARIN SODIUM 40 MG: 40 INJECTION SUBCUTANEOUS at 08:49

## 2021-05-31 RX ADMIN — SODIUM CHLORIDE, POTASSIUM CHLORIDE, SODIUM LACTATE AND CALCIUM CHLORIDE: 600; 310; 30; 20 INJECTION, SOLUTION INTRAVENOUS at 08:54

## 2021-05-31 RX ADMIN — SODIUM CHLORIDE 1500 MG: 900 INJECTION INTRAVENOUS at 03:02

## 2021-05-31 NOTE — CARE COORDINATION
CM spoke with Syd Boyce --patient lives with this individual, phone   Her  Tish Hendrickson is [phone] if she cannot be reached please contact him. She inquires about patient status. Update provided to caller.     KATHARINE HyattN, CCM, RN  [facility]  [phone]

## 2021-05-31 NOTE — PROGRESS NOTES
[facility]    5/31/2021 10:41 AM        Name: Dimitry Munoz . Admitted: 5/29/2021  Primary Care Provider: No primary care provider on file.  (Tel: None)      Subjective:    Patient in alcohol withdrawal requiring multiple dose of IV Ativan for elevated CIWA scale currently lethargic after receiving Ativan  Reviewed interval ancillary notes    Current Medications  vitamin B-12 (CYANOCOBALAMIN) tablet 500 mcg, Daily  magnesium sulfate 4000 mg in 100 mL IVPB premix, Once  potassium chloride 10 mEq/100 mL IVPB (Peripheral Line), Q1H  [START ON 6/1/2021] lisinopril (PRINIVIL;ZESTRIL) tablet 40 mg, Daily  labetalol (NORMODYNE;TRANDATE) injection 10 mg, Q6H PRN  ferrous sulfate (IRON 325) tablet 325 mg, BID WC  ampicillin-sulbactam (UNASYN) 1500 mg IVPB minibag, Q6H  lactated ringers infusion, Continuous  sodium chloride flush 0.9 % injection 5-40 mL, PRN  LORazepam (ATIVAN) tablet 1 mg, Q1H PRN   Or  LORazepam (ATIVAN) injection 1 mg, Q1H PRN   Or  LORazepam (ATIVAN) tablet 2 mg, Q1H PRN   Or  LORazepam (ATIVAN) injection 2 mg, Q1H PRN   Or  LORazepam (ATIVAN) tablet 3 mg, Q1H PRN   Or  LORazepam (ATIVAN) injection 3 mg, Q1H PRN   Or  LORazepam (ATIVAN) tablet 4 mg, Q1H PRN   Or  LORazepam (ATIVAN) injection 4 mg, Q1H PRN  sodium chloride flush 0.9 % injection 5-40 mL, 2 times per day  sodium chloride flush 0.9 % injection 5-40 mL, PRN  0.9 % sodium chloride infusion, PRN  enoxaparin (LOVENOX) injection 40 mg, Daily  promethazine (PHENERGAN) tablet 12.5 mg, Q6H PRN   Or  ondansetron (ZOFRAN) injection 4 mg, Q6H PRN  polyethylene glycol (GLYCOLAX) packet 17 g, Daily PRN  acetaminophen (TYLENOL) tablet 650 mg, Q6H PRN   Or  acetaminophen (TYLENOL) suppository 650 mg, Q6H PRN  sodium chloride 0.9 % 204 mL with folic acid 1 mg, adult multi-vitamin with vitamin k 10 mL, thiamine 100 mg, Daily  glucose (GLUTOSE) 40 % oral gel 15 g, PRN  dextrose 50 % IV solution, PRN  glucagon (rDNA) injection 1 mg, PRN  dextrose 5 % solution, PRN        Objective:  BP (!) 172/114   Pulse 112   Temp 97.6 °F (36.4 °C) (Axillary)   Resp 16   Ht 5' 4\" (1.626 m)   Wt 128 lb (58.1 kg)   SpO2 99%   BMI 21.97 kg/m²     Intake/Output Summary (Last 24 hours) at 5/31/2021 1041  Last data filed at 5/31/2021 0655  Gross per 24 hour   Intake 2439.63 ml   Output --   Net 2439.63 ml      Wt Readings from Last 3 Encounters:   05/31/21 128 lb (58.1 kg)       General appearance:  Appears comfortable. lying in bed sleeping  HEENT: atraumatic, Pupils equal, muscous membranes moist, no masses appreciated  Cardiovascular: tachycardic reg rhythm no murmurs appreciated   Respiratory: CTAB no wheezing  Gastrointestinal: Abdomen soft, non-tender, BS+  EXT: no edema  Neurology: no gross focal deficts,   Psychiatry: Appropriate affect. Not agitated  Skin: Warm, dry, no rashes appreciated    Labs and Tests:  CBC:   Recent Labs     05/29/21  0652 05/30/21  0438 05/31/21  0432   WBC 4.7 19.7* 14.2*   HGB 12.3* 11.7* 11.8*    237 209     BMP:    Recent Labs     05/29/21  0652 05/30/21  0438 05/31/21  0432    135* 145   K 4.6 3.9 3.3*    100 108   CO2 21 23 27   BUN 8 8 9   CREATININE 0.5* 0.6* 0.6*   GLUCOSE 106* 114* 129*     Hepatic:   Recent Labs     05/29/21  0652   AST 41*   ALT 15   BILITOT <0.2   ALKPHOS 136*     XR CHEST PORTABLE   Final Result   Bilateral lower lung zone pneumonia more severe on the left             Recent imaging reviewed    Problem List  Active Problems:    Hypoglycemia  Resolved Problems:    * No resolved hospital problems.  *         Assessment/Plan:   Hypoglycemia  - improved   - a1c 6.5 dc dm meds likely dc on metformin only  - monitor    Sepsis secondary to aspiration pna ( not POA) tachycardia elevated wbc  unasyn  - speech on board dysphagia 2 diet    etoh withdrawal  - CIWA scale ,requring multiple doses of ativan monitor closely if needed transfer to icu and phenobarbital  - banana bag       Acute metabolic encephalopathy: Secondary to above      hypokalemia and hypomagnsemia replace and recheck    Iron and b12 deficiency anemia : started on replacement will need outpatient colonscopy    Htn: prn labetolol and lisinpril to 40mg     DVT prophylaxis lovenox  Code status full code      Noni Wolfe MD   5/31/2021 10:41 AM

## 2021-05-31 NOTE — PLAN OF CARE
Problem: Pain:  Goal: Pain level will decrease  Description: Pain level will decrease  Outcome: Ongoing     Problem: Skin Integrity:  Goal: Will show no infection signs and symptoms  Description: Will show no infection signs and symptoms  Outcome: Ongoing     Problem: Falls - Risk of:  Goal: Will remain free from falls  Description: Will remain free from falls  Outcome: Ongoing  Goal: Absence of physical injury  Description: Absence of physical injury  Outcome: Ongoing   Pt medicated per [facility] and showed no S/S of pain. Pt skin is kept as dry and clean as possible with changing as needed. Pt remains free of falls and injury. WCTM.

## 2021-06-01 ENCOUNTER — APPOINTMENT (OUTPATIENT)
Dept: CT IMAGING | Age: 51
DRG: 420 | End: 2021-06-01
Payer: MEDICAID

## 2021-06-01 ENCOUNTER — APPOINTMENT (OUTPATIENT)
Dept: GENERAL RADIOLOGY | Age: 51
DRG: 420 | End: 2021-06-01
Payer: MEDICAID

## 2021-06-01 PROBLEM — F10.939 ALCOHOL WITHDRAWAL SYNDROME WITH COMPLICATION (HCC): Status: ACTIVE | Noted: 2021-06-01

## 2021-06-01 PROBLEM — E11.59 HYPERTENSION ASSOCIATED WITH DIABETES (HCC): Status: ACTIVE | Noted: 2021-06-01

## 2021-06-01 PROBLEM — I15.2 HYPERTENSION ASSOCIATED WITH DIABETES (HCC): Status: ACTIVE | Noted: 2021-06-01

## 2021-06-01 PROBLEM — F32.A ANXIETY AND DEPRESSION: Status: ACTIVE | Noted: 2021-06-01

## 2021-06-01 PROBLEM — E11.42 DIABETIC PERIPHERAL NEUROPATHY (HCC): Status: ACTIVE | Noted: 2021-06-01

## 2021-06-01 PROBLEM — I10 ESSENTIAL HYPERTENSION: Status: ACTIVE | Noted: 2021-06-01

## 2021-06-01 PROBLEM — E11.9 DIABETES MELLITUS TYPE 2 IN NONOBESE (HCC): Status: ACTIVE | Noted: 2021-06-01

## 2021-06-01 PROBLEM — J18.9 BILATERAL PNEUMONIA: Status: ACTIVE | Noted: 2021-06-01

## 2021-06-01 PROBLEM — E78.5 HYPERLIPIDEMIA ASSOCIATED WITH TYPE 2 DIABETES MELLITUS (HCC): Status: ACTIVE | Noted: 2021-06-01

## 2021-06-01 PROBLEM — F41.9 ANXIETY AND DEPRESSION: Status: ACTIVE | Noted: 2021-06-01

## 2021-06-01 PROBLEM — R40.4 TRANSIENT ALTERATION OF AWARENESS: Status: ACTIVE | Noted: 2021-06-01

## 2021-06-01 PROBLEM — E03.9 ACQUIRED HYPOTHYROIDISM: Status: ACTIVE | Noted: 2021-06-01

## 2021-06-01 PROBLEM — E11.69 HYPERLIPIDEMIA ASSOCIATED WITH TYPE 2 DIABETES MELLITUS (HCC): Status: ACTIVE | Noted: 2021-06-01

## 2021-06-01 LAB
AMPHETAMINE SCREEN, URINE: NORMAL
ANION GAP SERPL CALCULATED.3IONS-SCNC: 13 MMOL/L (ref 3–16)
BARBITURATE SCREEN URINE: NORMAL
BASOPHILS ABSOLUTE: 0 K/UL (ref 0–0.2)
BASOPHILS RELATIVE PERCENT: 0.2 %
BENZODIAZEPINE SCREEN, URINE: NORMAL
BUN BLDV-MCNC: 6 MG/DL (ref 7–20)
CALCIUM SERPL-MCNC: 8.5 MG/DL (ref 8.3–10.6)
CANNABINOID SCREEN URINE: NORMAL
CHLORIDE BLD-SCNC: 105 MMOL/L (ref 99–110)
CO2: 28 MMOL/L (ref 21–32)
COCAINE METABOLITE SCREEN URINE: NORMAL
CREAT SERPL-MCNC: <0.5 MG/DL (ref 0.9–1.3)
EOSINOPHILS ABSOLUTE: 0.1 K/UL (ref 0–0.6)
EOSINOPHILS RELATIVE PERCENT: 1.4 %
GFR AFRICAN AMERICAN: >60
GFR NON-AFRICAN AMERICAN: >60
GLUCOSE BLD-MCNC: 107 MG/DL (ref 70–99)
GLUCOSE BLD-MCNC: 139 MG/DL (ref 70–99)
GLUCOSE BLD-MCNC: 158 MG/DL (ref 70–99)
GLUCOSE BLD-MCNC: 159 MG/DL (ref 70–99)
GLUCOSE BLD-MCNC: 173 MG/DL (ref 70–99)
GLUCOSE BLD-MCNC: 89 MG/DL (ref 70–99)
HCT VFR BLD CALC: 32.6 % (ref 40.5–52.5)
HEMOGLOBIN: 11 G/DL (ref 13.5–17.5)
LACTIC ACID: 1.4 MMOL/L (ref 0.4–2)
LYMPHOCYTES ABSOLUTE: 1.2 K/UL (ref 1–5.1)
LYMPHOCYTES RELATIVE PERCENT: 12.3 %
Lab: NORMAL
MAGNESIUM: 1.8 MG/DL (ref 1.8–2.4)
MCH RBC QN AUTO: 33.5 PG (ref 26–34)
MCHC RBC AUTO-ENTMCNC: 33.8 G/DL (ref 31–36)
MCV RBC AUTO: 99.1 FL (ref 80–100)
METHADONE SCREEN, URINE: NORMAL
MONOCYTES ABSOLUTE: 0.6 K/UL (ref 0–1.3)
MONOCYTES RELATIVE PERCENT: 6.4 %
NEUTROPHILS ABSOLUTE: 7.7 K/UL (ref 1.7–7.7)
NEUTROPHILS RELATIVE PERCENT: 79.7 %
OPIATE SCREEN URINE: NORMAL
OXYCODONE URINE: NORMAL
PDW BLD-RTO: 12.8 % (ref 12.4–15.4)
PERFORMED ON: ABNORMAL
PERFORMED ON: NORMAL
PH UA: 7
PHENCYCLIDINE SCREEN URINE: NORMAL
PLATELET # BLD: 236 K/UL (ref 135–450)
PLATELET SLIDE REVIEW: ADEQUATE
PMV BLD AUTO: 8 FL (ref 5–10.5)
POTASSIUM REFLEX MAGNESIUM: 3.5 MMOL/L (ref 3.5–5.1)
PROCALCITONIN: 0.19 NG/ML (ref 0–0.15)
PROPOXYPHENE SCREEN: NORMAL
RBC # BLD: 3.3 M/UL (ref 4.2–5.9)
SLIDE REVIEW: ABNORMAL
SODIUM BLD-SCNC: 146 MMOL/L (ref 136–145)
WBC # BLD: 9.7 K/UL (ref 4–11)

## 2021-06-01 PROCEDURE — 95819 EEG AWAKE AND ASLEEP: CPT

## 2021-06-01 PROCEDURE — 83605 ASSAY OF LACTIC ACID: CPT

## 2021-06-01 PROCEDURE — 92526 ORAL FUNCTION THERAPY: CPT

## 2021-06-01 PROCEDURE — 2580000003 HC RX 258: Performed by: FAMILY MEDICINE

## 2021-06-01 PROCEDURE — 6370000000 HC RX 637 (ALT 250 FOR IP): Performed by: FAMILY MEDICINE

## 2021-06-01 PROCEDURE — 84145 PROCALCITONIN (PCT): CPT

## 2021-06-01 PROCEDURE — 2580000003 HC RX 258: Performed by: STUDENT IN AN ORGANIZED HEALTH CARE EDUCATION/TRAINING PROGRAM

## 2021-06-01 PROCEDURE — 2500000003 HC RX 250 WO HCPCS: Performed by: FAMILY MEDICINE

## 2021-06-01 PROCEDURE — 83735 ASSAY OF MAGNESIUM: CPT

## 2021-06-01 PROCEDURE — 87449 NOS EACH ORGANISM AG IA: CPT

## 2021-06-01 PROCEDURE — 0202U NFCT DS 22 TRGT SARS-COV-2: CPT

## 2021-06-01 PROCEDURE — 6360000002 HC RX W HCPCS: Performed by: STUDENT IN AN ORGANIZED HEALTH CARE EDUCATION/TRAINING PROGRAM

## 2021-06-01 PROCEDURE — 6370000000 HC RX 637 (ALT 250 FOR IP): Performed by: INTERNAL MEDICINE

## 2021-06-01 PROCEDURE — 2060000000 HC ICU INTERMEDIATE R&B

## 2021-06-01 PROCEDURE — 85025 COMPLETE CBC W/AUTO DIFF WBC: CPT

## 2021-06-01 PROCEDURE — 6360000002 HC RX W HCPCS: Performed by: FAMILY MEDICINE

## 2021-06-01 PROCEDURE — 80048 BASIC METABOLIC PNL TOTAL CA: CPT

## 2021-06-01 PROCEDURE — 95816 EEG AWAKE AND DROWSY: CPT | Performed by: PSYCHIATRY & NEUROLOGY

## 2021-06-01 PROCEDURE — 2500000003 HC RX 250 WO HCPCS: Performed by: INTERNAL MEDICINE

## 2021-06-01 PROCEDURE — 36415 COLL VENOUS BLD VENIPUNCTURE: CPT

## 2021-06-01 PROCEDURE — 71045 X-RAY EXAM CHEST 1 VIEW: CPT

## 2021-06-01 PROCEDURE — 80307 DRUG TEST PRSMV CHEM ANLYZR: CPT

## 2021-06-01 PROCEDURE — 2580000003 HC RX 258: Performed by: INTERNAL MEDICINE

## 2021-06-01 PROCEDURE — 6360000002 HC RX W HCPCS: Performed by: INTERNAL MEDICINE

## 2021-06-01 RX ORDER — CLONIDINE HYDROCHLORIDE 0.1 MG/1
0.2 TABLET ORAL ONCE
Status: COMPLETED | OUTPATIENT
Start: 2021-06-01 | End: 2021-06-01

## 2021-06-01 RX ORDER — LEVOTHYROXINE SODIUM 0.03 MG/1
50 TABLET ORAL
Status: DISCONTINUED | OUTPATIENT
Start: 2021-06-02 | End: 2021-06-07 | Stop reason: HOSPADM

## 2021-06-01 RX ORDER — FOLIC ACID 1 MG/1
1 TABLET ORAL DAILY
Status: DISCONTINUED | OUTPATIENT
Start: 2021-06-01 | End: 2021-06-01

## 2021-06-01 RX ORDER — GAUZE BANDAGE 2" X 2"
100 BANDAGE TOPICAL DAILY
Status: DISCONTINUED | OUTPATIENT
Start: 2021-06-02 | End: 2021-06-01

## 2021-06-01 RX ORDER — CLONIDINE HYDROCHLORIDE 0.1 MG/1
0.2 TABLET ORAL EVERY 8 HOURS PRN
Status: DISCONTINUED | OUTPATIENT
Start: 2021-06-01 | End: 2021-06-01

## 2021-06-01 RX ORDER — AMITRIPTYLINE HYDROCHLORIDE 50 MG/1
50 TABLET, FILM COATED ORAL NIGHTLY
Status: DISCONTINUED | OUTPATIENT
Start: 2021-06-01 | End: 2021-06-07 | Stop reason: HOSPADM

## 2021-06-01 RX ORDER — CLONIDINE HYDROCHLORIDE 0.1 MG/1
0.2 TABLET ORAL EVERY 6 HOURS PRN
Status: DISCONTINUED | OUTPATIENT
Start: 2021-06-01 | End: 2021-06-07 | Stop reason: HOSPADM

## 2021-06-01 RX ORDER — THIAMINE HYDROCHLORIDE 100 MG/ML
100 INJECTION, SOLUTION INTRAMUSCULAR; INTRAVENOUS DAILY
Status: DISCONTINUED | OUTPATIENT
Start: 2021-06-02 | End: 2021-06-06

## 2021-06-01 RX ORDER — CARVEDILOL 6.25 MG/1
12.5 TABLET ORAL 2 TIMES DAILY WITH MEALS
Status: DISCONTINUED | OUTPATIENT
Start: 2021-06-01 | End: 2021-06-02

## 2021-06-01 RX ORDER — FLUOXETINE HYDROCHLORIDE 20 MG/1
20 CAPSULE ORAL DAILY
Status: DISCONTINUED | OUTPATIENT
Start: 2021-06-01 | End: 2021-06-07 | Stop reason: HOSPADM

## 2021-06-01 RX ORDER — PRAVASTATIN SODIUM 40 MG
40 TABLET ORAL DAILY
Status: DISCONTINUED | OUTPATIENT
Start: 2021-06-01 | End: 2021-06-07 | Stop reason: HOSPADM

## 2021-06-01 RX ORDER — CHLORDIAZEPOXIDE HYDROCHLORIDE 25 MG/1
50 CAPSULE, GELATIN COATED ORAL EVERY 6 HOURS
Status: DISCONTINUED | OUTPATIENT
Start: 2021-06-01 | End: 2021-06-02

## 2021-06-01 RX ORDER — GABAPENTIN 100 MG/1
100 CAPSULE ORAL 3 TIMES DAILY
Status: DISCONTINUED | OUTPATIENT
Start: 2021-06-01 | End: 2021-06-07 | Stop reason: HOSPADM

## 2021-06-01 RX ORDER — CHLORDIAZEPOXIDE HYDROCHLORIDE 25 MG/1
25 CAPSULE, GELATIN COATED ORAL ONCE
Status: COMPLETED | OUTPATIENT
Start: 2021-06-01 | End: 2021-06-01

## 2021-06-01 RX ORDER — POTASSIUM CHLORIDE AND SODIUM CHLORIDE 450; 150 MG/100ML; MG/100ML
INJECTION, SOLUTION INTRAVENOUS CONTINUOUS
Status: DISCONTINUED | OUTPATIENT
Start: 2021-06-01 | End: 2021-06-05

## 2021-06-01 RX ORDER — CHLORDIAZEPOXIDE HYDROCHLORIDE 25 MG/1
25 CAPSULE, GELATIN COATED ORAL EVERY 6 HOURS
Status: DISCONTINUED | OUTPATIENT
Start: 2021-06-01 | End: 2021-06-01

## 2021-06-01 RX ORDER — CARVEDILOL 6.25 MG/1
6.25 TABLET ORAL 2 TIMES DAILY WITH MEALS
Status: DISCONTINUED | OUTPATIENT
Start: 2021-06-01 | End: 2021-06-01

## 2021-06-01 RX ORDER — FOLIC ACID 1 MG/1
1 TABLET ORAL DAILY
Status: DISCONTINUED | OUTPATIENT
Start: 2021-06-02 | End: 2021-06-07 | Stop reason: HOSPADM

## 2021-06-01 RX ORDER — THIAMINE HYDROCHLORIDE 100 MG/ML
100 INJECTION, SOLUTION INTRAMUSCULAR; INTRAVENOUS DAILY
Status: DISCONTINUED | OUTPATIENT
Start: 2021-06-01 | End: 2021-06-01

## 2021-06-01 RX ORDER — INSULIN LISPRO 100 [IU]/ML
0-12 INJECTION, SOLUTION INTRAVENOUS; SUBCUTANEOUS EVERY 6 HOURS
Status: DISCONTINUED | OUTPATIENT
Start: 2021-06-01 | End: 2021-06-04

## 2021-06-01 RX ADMIN — Medication 10 ML: at 23:15

## 2021-06-01 RX ADMIN — SODIUM CHLORIDE 1500 MG: 900 INJECTION INTRAVENOUS at 15:36

## 2021-06-01 RX ADMIN — Medication 10 ML: at 20:14

## 2021-06-01 RX ADMIN — LABETALOL HYDROCHLORIDE 10 MG: 5 INJECTION INTRAVENOUS at 04:19

## 2021-06-01 RX ADMIN — SODIUM CHLORIDE, POTASSIUM CHLORIDE, SODIUM LACTATE AND CALCIUM CHLORIDE: 600; 310; 30; 20 INJECTION, SOLUTION INTRAVENOUS at 07:45

## 2021-06-01 RX ADMIN — Medication 10 ML: at 09:15

## 2021-06-01 RX ADMIN — FOLIC ACID: 5 INJECTION, SOLUTION INTRAMUSCULAR; INTRAVENOUS; SUBCUTANEOUS at 11:07

## 2021-06-01 RX ADMIN — LABETALOL HYDROCHLORIDE 10 MG: 5 INJECTION INTRAVENOUS at 23:15

## 2021-06-01 RX ADMIN — SODIUM CHLORIDE 1500 MG: 900 INJECTION INTRAVENOUS at 03:12

## 2021-06-01 RX ADMIN — SODIUM CHLORIDE 1500 MG: 900 INJECTION INTRAVENOUS at 09:19

## 2021-06-01 RX ADMIN — CLONIDINE HYDROCHLORIDE 0.2 MG: 0.1 TABLET ORAL at 14:27

## 2021-06-01 RX ADMIN — LORAZEPAM 4 MG: 2 INJECTION INTRAMUSCULAR; INTRAVENOUS at 03:04

## 2021-06-01 RX ADMIN — LORAZEPAM 4 MG: 2 INJECTION INTRAMUSCULAR; INTRAVENOUS at 14:14

## 2021-06-01 RX ADMIN — LORAZEPAM 3 MG: 2 INJECTION INTRAMUSCULAR; INTRAVENOUS at 15:32

## 2021-06-01 RX ADMIN — GABAPENTIN 100 MG: 100 CAPSULE ORAL at 14:27

## 2021-06-01 RX ADMIN — Medication 10 ML: at 22:10

## 2021-06-01 RX ADMIN — ENOXAPARIN SODIUM 40 MG: 40 INJECTION SUBCUTANEOUS at 09:14

## 2021-06-01 RX ADMIN — INSULIN LISPRO 2 UNITS: 100 INJECTION, SOLUTION INTRAVENOUS; SUBCUTANEOUS at 14:27

## 2021-06-01 RX ADMIN — PRAVASTATIN SODIUM 40 MG: 40 TABLET ORAL at 14:27

## 2021-06-01 RX ADMIN — ONDANSETRON 4 MG: 2 INJECTION INTRAMUSCULAR; INTRAVENOUS at 02:21

## 2021-06-01 RX ADMIN — LORAZEPAM 4 MG: 2 INJECTION INTRAMUSCULAR; INTRAVENOUS at 04:20

## 2021-06-01 RX ADMIN — LORAZEPAM 2 MG: 2 INJECTION INTRAMUSCULAR; INTRAVENOUS at 20:20

## 2021-06-01 RX ADMIN — FERROUS SULFATE TAB 325 MG (65 MG ELEMENTAL FE) 325 MG: 325 (65 FE) TAB at 09:14

## 2021-06-01 RX ADMIN — LORAZEPAM 4 MG: 2 INJECTION INTRAMUSCULAR; INTRAVENOUS at 02:04

## 2021-06-01 RX ADMIN — LISINOPRIL 40 MG: 20 TABLET ORAL at 09:14

## 2021-06-01 RX ADMIN — SODIUM CHLORIDE 1500 MG: 900 INJECTION INTRAVENOUS at 22:13

## 2021-06-01 RX ADMIN — LORAZEPAM 3 MG: 2 INJECTION INTRAMUSCULAR; INTRAVENOUS at 06:30

## 2021-06-01 RX ADMIN — CARVEDILOL 6.25 MG: 6.25 TABLET, FILM COATED ORAL at 11:29

## 2021-06-01 RX ADMIN — LORAZEPAM 2 MG: 2 INJECTION INTRAMUSCULAR; INTRAVENOUS at 23:14

## 2021-06-01 RX ADMIN — CYANOCOBALAMIN TAB 1000 MCG 500 MCG: 1000 TAB at 09:14

## 2021-06-01 RX ADMIN — FOLIC ACID: 5 INJECTION, SOLUTION INTRAMUSCULAR; INTRAVENOUS; SUBCUTANEOUS at 11:04

## 2021-06-01 RX ADMIN — FLUOXETINE 20 MG: 20 CAPSULE ORAL at 14:27

## 2021-06-01 RX ADMIN — POTASSIUM CHLORIDE AND SODIUM CHLORIDE: 450; 150 INJECTION, SOLUTION INTRAVENOUS at 15:42

## 2021-06-01 RX ADMIN — LORAZEPAM 3 MG: 2 INJECTION INTRAMUSCULAR; INTRAVENOUS at 11:29

## 2021-06-01 RX ADMIN — LABETALOL HYDROCHLORIDE 10 MG: 5 INJECTION INTRAVENOUS at 09:22

## 2021-06-01 RX ADMIN — CHLORDIAZEPOXIDE HYDROCHLORIDE 25 MG: 25 CAPSULE ORAL at 15:46

## 2021-06-01 ASSESSMENT — PAIN SCALES - GENERAL
PAINLEVEL_OUTOF10: 1
PAINLEVEL_OUTOF10: 1

## 2021-06-01 NOTE — PROGRESS NOTES
Hospital Medicine Progress Note     Date:  6/1/2021    PCP: No primary care provider on file. (Tel: None)    Date of Admission: 5/29/2021        Subjective  Patient is confused, unclear of baseline. Patient's friend with whom patient lives with states that he normally can converse in [place] without any trouble. Objective  Physical exam:  Vitals: BP (!) 176/103   Pulse 90   Temp 98.2 °F (36.8 °C) (Axillary)   Resp 14   Ht 5' 4\" (1.626 m)   Wt 126 lb 14.4 oz (57.6 kg)   SpO2 99%   BMI 21.78 kg/m²   Gen: Jittery  Head: Normocephalic. Atraumatic. Eyes: PERRLA  ENT: Oral mucosa moist  Neck: No JVD. No obvious thyromegaly. CVS: Nml S1S2, no MRG, RRR  Pulmomary: Clear bilaterally. No crackles. No wheezes. Gastrointestinal: Soft, NT/ND. Positive bowel sounds. Musculoskeletal: No edema. Warm  Neuro: Moving extremities spontaneously, confused  Psychiatry: Has episodes of agitation  Skin: Warm, dry with normal turgor. No rash      24HR INTAKE/OUTPUT:      Intake/Output Summary (Last 24 hours) at 6/1/2021 1724  Last data filed at 6/1/2021 1647  Gross per 24 hour   Intake 3925.56 ml   Output 425 ml   Net 3500.56 ml     I/O last 3 completed shifts: In: 3119.1 [P.O.:50; I.V.:2374.4; IV Piggyback:694.7]  Out: 425 [Urine:425]  I/O this shift:   In: 806.4 [I.V.:667.9; IV Piggyback:138.6]  Out: -       Meds:    carvedilol  6.25 mg Oral BID WC    [START ON 6/2/2021] levothyroxine  50 mcg Oral QAM AC    insulin lispro  0-12 Units Subcutaneous Q6H    [START ON 1/7/0350] folic acid  1 mg Oral Daily    [START ON 6/2/2021] thiamine  100 mg Intravenous Daily    gabapentin  100 mg Oral TID    FLUoxetine  20 mg Oral Daily    pravastatin  40 mg Oral Daily    amitriptyline  50 mg Oral Nightly    chlordiazePOXIDE  50 mg Oral Q6H    vitamin B-12  500 mcg Oral Daily    lisinopril  40 mg Oral Daily    ferrous sulfate  325 mg Oral BID WC    ampicillin-sulbactam  1,500 mg Intravenous Q6H    sodium chloride flush 5-40 mL Intravenous 2 times per day    enoxaparin  40 mg Subcutaneous Daily       Infusions:    0.45 % NaCl with KCl 20 mEq 75 mL/hr at 06/01/21 1647    sodium chloride      dextrose           PRN Meds: cloNIDine, labetalol, sodium chloride flush, LORazepam **OR** LORazepam **OR** LORazepam **OR** LORazepam **OR** LORazepam **OR** LORazepam **OR** LORazepam **OR** LORazepam, sodium chloride flush, sodium chloride, promethazine **OR** ondansetron, polyethylene glycol, acetaminophen **OR** acetaminophen, glucose, dextrose, glucagon (rDNA), dextrose    Labs/imaging:  CBC:   Recent Labs     05/30/21 0438 05/31/21 0432 06/01/21  0434   WBC 19.7* 14.2* 9.7   HGB 11.7* 11.8* 11.0*    209 236         BMP:    Recent Labs     05/30/21 0438 05/31/21 0432 05/31/21 2119 06/01/21  0434   * 145  --  146*   K 3.9 3.3* 3.4* 3.5    108  --  105   CO2 23 27  --  28   BUN 8 9  --  6*   CREATININE 0.6* 0.6*  --  <0.5*   GLUCOSE 114* 129*  --  173*         Hepatic: No results for input(s): AST, ALT, ALB, BILITOT, ALKPHOS in the last 72 hours. Troponin: No results for input(s): TROPONINI in the last 72 hours. BNP: No results for input(s): BNP in the last 72 hours. INR: No results for input(s): INR in the last 72 hours. Reviewed imaging and reports noted      Assessment:  Active Problems:    Hypoglycemia    New onset seizure ([address]    Hypertension associated with diabetes ([address]    Transient alteration of awareness    Alcohol withdrawal syndrome with complication (HCC)    Diabetes mellitus type 2 in nonobese (HCC)    Anxiety and depression    Diabetic peripheral neuropathy ([address]    Hyperlipidemia associated with type 2 diabetes mellitus ([facility]  Resolved Problems:    * No resolved hospital problems. *        Plan:   Altered mental status  -Unclear etiology  -Could be secondary to combination of hypoglycemia, suspected alcohol withdrawal and pneumonia  -Consult neurology  -CT head

## 2021-06-01 NOTE — PROGRESS NOTES
Pt continues to score higher on CIWA scale. Pt was grabbing at staff trying to climb out of bed, trying to climb out of bed, trying to pull off seizure pads, and taking off all clothes and brief. Pt cleaned again, pulled up in bed and given Zofran for nausea. HINA.

## 2021-06-01 NOTE — FLOWSHEET NOTE
Messaged Dr. Raimundo Ernandez to notify of pt bp and that prn labetolol was not due yet.  Pt resting in bed quietly eyes closed

## 2021-06-01 NOTE — FLOWSHEET NOTE
Attempted to use language line to communicate with pt,  states he is not answering questions appropriately and is not making sense. Pt attempted to get out of bed without using call light.  Pt put back in bed

## 2021-06-01 NOTE — PROGRESS NOTES
Speech Language Pathology  Dysphagia Treatment Note    Name:  Varun Hinojosa  :   1970  Medical Diagnosis:  Hypoglycemia [E16.2]  Treatment Diagnosis: Oropharyngeal Dysphagia  Pain level: Pt did not report pain    Current Diet Level: Dysphagia II Minced and Moist with thin liquids, meds in puree    Tolerance of Current Diet Level: Pt's nurse and pt did not report concerns with pt's tolerance of his current diet level. Pt completed a chest x-ray which indicated: Bilateral lower lung zone pneumonia more severe on the left    Assessment of Texture Tolerance:  Pt was seen sitting upright in bed,  services were used as Pt is Sami speaking. Pt presented with the following PO trials: puree, minced/moist, and thin liquids via cup. Pt did not attempt to feed himself, requiring SLP's assistance. Puree: Pt accepted each of the trials (x3), but demonstrates reduced oral awareness with minimal to no initiation of mastication or the swallow (determined via palpation) despite moderate verbal cues. Pt benefited from liquid wash to promote improved oral clearance. Pt demonstrated no overt signs/symptoms of penetration/aspiration following liquid wash. As trials continued, pt demonstrated reduced labial closure to remove the trial from the spoon and eventually expectorated the trial rather than initiating the swallow. Moist/mechanical: Pt accepted the trial (x1), but demonstrates extensive mastication (approximately 10 minutes) with the small bolus and multiple liquid washes to improve oral clearance.  Pt demonstrates variable direction following despite verbal/visual cues from the SLP and  to \"open wide\" for SLP to assess oral clearance, to use the lingual sweep compensatory strategy or to expectorate the trial. SLP utilized toothette to remove remaining trial, minimal residue remained despite pt demonstrating extensive mastication (suspect perseverative) and reporting the trial was \"almost\" swallowed. Thin liquids via cup: Pt accepted multiple trials and demonstrated occasional delayed single cough. Pt demonstrates delayed initiation of the swallow with oral holding, requiring moderate verbal/visual cues from the SLP and  to initiate the swallow. Throughout the session the pt demonstrated several behaviors including responding via head shake \"no\" but verbally responding \"yes\" in Nepali, occasionally tensing his body, or appearing to demonstrate fluctuations in his alertness requiring verbal/tactile cues to improve. Recommend downgrade to NPO at this time to promote increased safety and reduced risk of penetration/aspiration. Diet and Treatment Recommendations: Recommend downgrade to NPO  Allow crushed medications in puree only with pt alert. 1. Pt will functionally tolerate recommended diet with no overt clinical s/s of aspiration (ongoing 6/1/2021)  2. Pt will demonstrate understanding of aspiration risk and precautions via education/demonstration with occasional prompting (ongoing 6/1/2021)  3. Pt will advance to least restrictive diet as indicated (ongoing 6/1/2021)  4. If clinical s/s of aspiration/penetration continue to be noted, Pt will participate in Modified Barium Swallow Study (ongoing 6/1/2021)    Plan:  Continued daily Dysphagia treatment with goals per plan of care. Patient/Family Education:Education given to the Pt (suspect he will benefit from further repetitions to improve recall/comprehension) and nurse, who verbalized understanding    Discharge Recommendations:  Pt will benefit from continued skilled Speech Therapy for Dysphagia services, prior to returning home. Timed Code Treatment: 0     Total Treatment Time: 30    If patient discharges prior to next session this note will serve as a discharge summary.        Signature:   ALLISON Alba   Speech-Language Pathologist

## 2021-06-01 NOTE — PROCEDURES
Patient: Pankaj Akins    MR Number: [id number]  YOB: 1970  Date of Visit: 6/1/2021    Clinical History:  The patient is a 45y.o. years old male with acute confusion and possible seizure. Method: The EEG was performed utilizing the international 10/20 of electrode placements of both referential and bipolar montages. The patient was awake and drowsy through out the recording. Photic stimulation was performed. Findings: The background of the EEG showed normal alpha posterior background of 8-9  HZ and amplitude of 20-40 UV. This background was symmetric, waxing and waning, and reactive with eye opening and closure. As the patient became drowsy, generalized diffuse slowing was seen through recording at 6-7 HZ. This generalized slowing was symmetric, non rhythmical, and continuous. No spike or sharp waves were seen. Photic stimulation did not activate EEG. Impression: This EEG  is within normal limits. There is no evidence of epileptiform discharges, focal, or lateralizing abnormalities.       Catrachito Pittman MD      Board certified in clinical neurophysiology

## 2021-06-01 NOTE — PLAN OF CARE
Problem: Pain:  Goal: Pain level will decrease  Description: Pain level will decrease  Outcome: Ongoing     Problem: Skin Integrity:  Goal: Will show no infection signs and symptoms  Description: Will show no infection signs and symptoms  Outcome: Ongoing  Goal: Absence of new skin breakdown  Description: Absence of new skin breakdown  Outcome: Ongoing     Problem: Falls - Risk of:  Goal: Will remain free from falls  Description: Will remain free from falls  6/1/2021 0256 by Crystal Sifuentes RN  Outcome: Ongoing  5/31/2021 1755 by Queta Baez RN  Outcome: Ongoing  Goal: Absence of physical injury  Description: Absence of physical injury  Outcome: Ongoing   Pt has shown no s/s of pain that are not being treated with CIWA protocol. Pt skin being cleaned as frequently as possible of incontinence. Pt remains free of injury and falls although it is becoming harder to keep him safe r/t withdrawals. Pt increase in agitation and anxiety. WCTM.

## 2021-06-01 NOTE — PROGRESS NOTES
Pt was not communicating clear or well enough for the interpretor to clearly help with CIWA assessment. However, the information gathered was enough that his CIWA score was 22. WCTM.

## 2021-06-02 ENCOUNTER — APPOINTMENT (OUTPATIENT)
Dept: CT IMAGING | Age: 51
DRG: 420 | End: 2021-06-02
Payer: MEDICAID

## 2021-06-02 LAB
ALBUMIN SERPL-MCNC: 2.7 G/DL (ref 3.4–5)
ALP BLD-CCNC: 116 U/L (ref 40–129)
ALT SERPL-CCNC: 13 U/L (ref 10–40)
ANION GAP SERPL CALCULATED.3IONS-SCNC: 14 MMOL/L (ref 3–16)
AST SERPL-CCNC: 28 U/L (ref 15–37)
BASOPHILS ABSOLUTE: 0 K/UL (ref 0–0.2)
BASOPHILS RELATIVE PERCENT: 0.3 %
BILIRUB SERPL-MCNC: 0.4 MG/DL (ref 0–1)
BILIRUBIN DIRECT: <0.2 MG/DL (ref 0–0.3)
BILIRUBIN, INDIRECT: ABNORMAL MG/DL (ref 0–1)
BUN BLDV-MCNC: 6 MG/DL (ref 7–20)
CALCIUM SERPL-MCNC: 8.2 MG/DL (ref 8.3–10.6)
CHLORIDE BLD-SCNC: 103 MMOL/L (ref 99–110)
CO2: 27 MMOL/L (ref 21–32)
CREAT SERPL-MCNC: 0.5 MG/DL (ref 0.9–1.3)
EOSINOPHILS ABSOLUTE: 0.1 K/UL (ref 0–0.6)
EOSINOPHILS RELATIVE PERCENT: 1.7 %
GFR AFRICAN AMERICAN: >60
GFR NON-AFRICAN AMERICAN: >60
GLUCOSE BLD-MCNC: 112 MG/DL (ref 70–99)
GLUCOSE BLD-MCNC: 137 MG/DL (ref 70–99)
GLUCOSE BLD-MCNC: 145 MG/DL (ref 70–99)
GLUCOSE BLD-MCNC: 146 MG/DL (ref 70–99)
GLUCOSE BLD-MCNC: 149 MG/DL (ref 70–99)
GLUCOSE BLD-MCNC: 152 MG/DL (ref 70–99)
HCT VFR BLD CALC: 32.5 % (ref 40.5–52.5)
HEMOGLOBIN: 11.1 G/DL (ref 13.5–17.5)
L. PNEUMOPHILA SEROGP 1 UR AG: NORMAL
LYMPHOCYTES ABSOLUTE: 1.3 K/UL (ref 1–5.1)
LYMPHOCYTES RELATIVE PERCENT: 19.2 %
MAGNESIUM: 1.4 MG/DL (ref 1.8–2.4)
MCH RBC QN AUTO: 33.8 PG (ref 26–34)
MCHC RBC AUTO-ENTMCNC: 34.2 G/DL (ref 31–36)
MCV RBC AUTO: 98.9 FL (ref 80–100)
MONOCYTES ABSOLUTE: 0.6 K/UL (ref 0–1.3)
MONOCYTES RELATIVE PERCENT: 8.8 %
NEUTROPHILS ABSOLUTE: 4.7 K/UL (ref 1.7–7.7)
NEUTROPHILS RELATIVE PERCENT: 70 %
PDW BLD-RTO: 13 % (ref 12.4–15.4)
PERFORMED ON: ABNORMAL
PLATELET # BLD: 220 K/UL (ref 135–450)
PMV BLD AUTO: 7.4 FL (ref 5–10.5)
POTASSIUM REFLEX MAGNESIUM: 3 MMOL/L (ref 3.5–5.1)
POTASSIUM SERPL-SCNC: 3.8 MMOL/L (ref 3.5–5.1)
RBC # BLD: 3.28 M/UL (ref 4.2–5.9)
REPORT: NORMAL
RESPIRATORY PANEL PCR: NORMAL
SODIUM BLD-SCNC: 144 MMOL/L (ref 136–145)
STREP PNEUMONIAE ANTIGEN, URINE: NORMAL
TOTAL PROTEIN: 6.1 G/DL (ref 6.4–8.2)
WBC # BLD: 6.7 K/UL (ref 4–11)

## 2021-06-02 PROCEDURE — 84132 ASSAY OF SERUM POTASSIUM: CPT

## 2021-06-02 PROCEDURE — 83735 ASSAY OF MAGNESIUM: CPT

## 2021-06-02 PROCEDURE — 6360000002 HC RX W HCPCS: Performed by: INTERNAL MEDICINE

## 2021-06-02 PROCEDURE — 2500000003 HC RX 250 WO HCPCS: Performed by: PHYSICIAN ASSISTANT

## 2021-06-02 PROCEDURE — 2580000003 HC RX 258: Performed by: INTERNAL MEDICINE

## 2021-06-02 PROCEDURE — 80076 HEPATIC FUNCTION PANEL: CPT

## 2021-06-02 PROCEDURE — 70450 CT HEAD/BRAIN W/O DYE: CPT

## 2021-06-02 PROCEDURE — 6370000000 HC RX 637 (ALT 250 FOR IP): Performed by: INTERNAL MEDICINE

## 2021-06-02 PROCEDURE — 85025 COMPLETE CBC W/AUTO DIFF WBC: CPT

## 2021-06-02 PROCEDURE — 6370000000 HC RX 637 (ALT 250 FOR IP): Performed by: FAMILY MEDICINE

## 2021-06-02 PROCEDURE — 6360000002 HC RX W HCPCS: Performed by: FAMILY MEDICINE

## 2021-06-02 PROCEDURE — 36415 COLL VENOUS BLD VENIPUNCTURE: CPT

## 2021-06-02 PROCEDURE — 6360000002 HC RX W HCPCS: Performed by: STUDENT IN AN ORGANIZED HEALTH CARE EDUCATION/TRAINING PROGRAM

## 2021-06-02 PROCEDURE — 80048 BASIC METABOLIC PNL TOTAL CA: CPT

## 2021-06-02 PROCEDURE — 92526 ORAL FUNCTION THERAPY: CPT

## 2021-06-02 PROCEDURE — 2060000000 HC ICU INTERMEDIATE R&B

## 2021-06-02 PROCEDURE — 99223 1ST HOSP IP/OBS HIGH 75: CPT | Performed by: PSYCHIATRY & NEUROLOGY

## 2021-06-02 RX ORDER — LORAZEPAM 2 MG/ML
1 INJECTION INTRAMUSCULAR ONCE
Status: COMPLETED | OUTPATIENT
Start: 2021-06-02 | End: 2021-06-02

## 2021-06-02 RX ORDER — LISINOPRIL 10 MG/1
10 TABLET ORAL DAILY
Status: DISCONTINUED | OUTPATIENT
Start: 2021-06-03 | End: 2021-06-07 | Stop reason: HOSPADM

## 2021-06-02 RX ORDER — POTASSIUM CHLORIDE 7.45 MG/ML
10 INJECTION INTRAVENOUS PRN
Status: DISCONTINUED | OUTPATIENT
Start: 2021-06-02 | End: 2021-06-07 | Stop reason: HOSPADM

## 2021-06-02 RX ORDER — LABETALOL HYDROCHLORIDE 5 MG/ML
10 INJECTION, SOLUTION INTRAVENOUS ONCE
Status: COMPLETED | OUTPATIENT
Start: 2021-06-02 | End: 2021-06-02

## 2021-06-02 RX ORDER — POTASSIUM CHLORIDE 20 MEQ/1
40 TABLET, EXTENDED RELEASE ORAL PRN
Status: DISCONTINUED | OUTPATIENT
Start: 2021-06-02 | End: 2021-06-07 | Stop reason: HOSPADM

## 2021-06-02 RX ORDER — MAGNESIUM SULFATE IN WATER 40 MG/ML
2000 INJECTION, SOLUTION INTRAVENOUS ONCE
Status: COMPLETED | OUTPATIENT
Start: 2021-06-02 | End: 2021-06-02

## 2021-06-02 RX ADMIN — LORAZEPAM 1 MG: 2 INJECTION INTRAMUSCULAR; INTRAVENOUS at 16:37

## 2021-06-02 RX ADMIN — POTASSIUM CHLORIDE 10 MEQ: 7.46 INJECTION, SOLUTION INTRAVENOUS at 14:28

## 2021-06-02 RX ADMIN — LORAZEPAM 4 MG: 2 INJECTION INTRAMUSCULAR; INTRAVENOUS at 01:28

## 2021-06-02 RX ADMIN — LEVOTHYROXINE SODIUM 50 MCG: 0.03 TABLET ORAL at 06:14

## 2021-06-02 RX ADMIN — GABAPENTIN 100 MG: 100 CAPSULE ORAL at 16:17

## 2021-06-02 RX ADMIN — INSULIN LISPRO 2 UNITS: 100 INJECTION, SOLUTION INTRAVENOUS; SUBCUTANEOUS at 16:42

## 2021-06-02 RX ADMIN — SODIUM CHLORIDE 1500 MG: 900 INJECTION INTRAVENOUS at 03:27

## 2021-06-02 RX ADMIN — CHLORDIAZEPOXIDE HYDROCHLORIDE 50 MG: 25 CAPSULE ORAL at 00:15

## 2021-06-02 RX ADMIN — INSULIN LISPRO 2 UNITS: 100 INJECTION, SOLUTION INTRAVENOUS; SUBCUTANEOUS at 05:36

## 2021-06-02 RX ADMIN — THIAMINE HYDROCHLORIDE 100 MG: 100 INJECTION, SOLUTION INTRAMUSCULAR; INTRAVENOUS at 10:45

## 2021-06-02 RX ADMIN — PRAVASTATIN SODIUM 40 MG: 40 TABLET ORAL at 16:17

## 2021-06-02 RX ADMIN — LORAZEPAM 4 MG: 2 INJECTION INTRAMUSCULAR; INTRAVENOUS at 06:23

## 2021-06-02 RX ADMIN — POTASSIUM CHLORIDE 10 MEQ: 7.46 INJECTION, SOLUTION INTRAVENOUS at 13:22

## 2021-06-02 RX ADMIN — CHLORDIAZEPOXIDE HYDROCHLORIDE 50 MG: 25 CAPSULE ORAL at 06:14

## 2021-06-02 RX ADMIN — LORAZEPAM 4 MG: 2 INJECTION INTRAMUSCULAR; INTRAVENOUS at 00:27

## 2021-06-02 RX ADMIN — CLONIDINE HYDROCHLORIDE 0.2 MG: 0.1 TABLET ORAL at 00:15

## 2021-06-02 RX ADMIN — LABETALOL HYDROCHLORIDE 10 MG: 5 INJECTION INTRAVENOUS at 02:25

## 2021-06-02 RX ADMIN — MAGNESIUM GLUCONATE 500 MG ORAL TABLET 400 MG: 500 TABLET ORAL at 16:16

## 2021-06-02 RX ADMIN — FLUOXETINE 20 MG: 20 CAPSULE ORAL at 16:17

## 2021-06-02 RX ADMIN — SODIUM CHLORIDE 1500 MG: 900 INJECTION INTRAVENOUS at 17:42

## 2021-06-02 RX ADMIN — POTASSIUM CHLORIDE 10 MEQ: 7.46 INJECTION, SOLUTION INTRAVENOUS at 12:20

## 2021-06-02 RX ADMIN — LORAZEPAM 1 MG: 2 INJECTION INTRAMUSCULAR; INTRAVENOUS at 03:20

## 2021-06-02 RX ADMIN — POTASSIUM CHLORIDE 10 MEQ: 7.46 INJECTION, SOLUTION INTRAVENOUS at 11:13

## 2021-06-02 RX ADMIN — POTASSIUM CHLORIDE 10 MEQ: 7.46 INJECTION, SOLUTION INTRAVENOUS at 15:50

## 2021-06-02 RX ADMIN — POTASSIUM CHLORIDE 10 MEQ: 7.46 INJECTION, SOLUTION INTRAVENOUS at 10:31

## 2021-06-02 RX ADMIN — FERROUS SULFATE TAB 325 MG (65 MG ELEMENTAL FE) 325 MG: 325 (65 FE) TAB at 16:17

## 2021-06-02 RX ADMIN — POTASSIUM CHLORIDE AND SODIUM CHLORIDE: 450; 150 INJECTION, SOLUTION INTRAVENOUS at 05:36

## 2021-06-02 RX ADMIN — Medication 10 ML: at 03:21

## 2021-06-02 RX ADMIN — Medication 10 ML: at 16:17

## 2021-06-02 RX ADMIN — FOLIC ACID 1 MG: 1 TABLET ORAL at 16:16

## 2021-06-02 RX ADMIN — CYANOCOBALAMIN TAB 1000 MCG 500 MCG: 1000 TAB at 16:16

## 2021-06-02 RX ADMIN — SODIUM CHLORIDE 1500 MG: 900 INJECTION INTRAVENOUS at 12:30

## 2021-06-02 RX ADMIN — ENOXAPARIN SODIUM 40 MG: 40 INJECTION SUBCUTANEOUS at 10:46

## 2021-06-02 RX ADMIN — INSULIN LISPRO 2 UNITS: 100 INJECTION, SOLUTION INTRAVENOUS; SUBCUTANEOUS at 11:14

## 2021-06-02 RX ADMIN — MAGNESIUM SULFATE HEPTAHYDRATE 2000 MG: 40 INJECTION, SOLUTION INTRAVENOUS at 10:44

## 2021-06-02 ASSESSMENT — PAIN SCALES - WONG BAKER: WONGBAKER_NUMERICALRESPONSE: 0

## 2021-06-02 ASSESSMENT — PAIN SCALES - GENERAL
PAINLEVEL_OUTOF10: 0
PAINLEVEL_OUTOF10: 0

## 2021-06-02 NOTE — PLAN OF CARE
Problem: Pain:  Goal: Pain level will decrease  Description: Pain level will decrease  Outcome: Ongoing     Problem: Skin Integrity:  Goal: Will show no infection signs and symptoms  Description: Will show no infection signs and symptoms  Outcome: Ongoing  Goal: Absence of new skin breakdown  Description: Absence of new skin breakdown  Outcome: Ongoing     Problem: Falls - Risk of:  Goal: Will remain free from falls  Description: Will remain free from falls  Outcome: Ongoing  Goal: Absence of physical injury  Description: Absence of physical injury  Outcome: Ongoing   Pt has only reported pain that is associated with withdrawal that was medicated per MAR. Pt remains free of falls and injury. Pt cleaned and repositioned frequently and shows no S/S infection or skin breakdown.

## 2021-06-02 NOTE — PROGRESS NOTES
Pt has started to hallucinate and keeps grabbing at things that are not there and talking about tacos and putting on pants for work.

## 2021-06-02 NOTE — PROGRESS NOTES
Speech Language Pathology    Gerardo Chris  1970    Attempted to see patient for dysphagia therapy, RN requested therapist return later this date as Pt is sleeping. Will attempt to follow-up as schedule allows.     Xenia Mcginnis M.A., [address] [place] [address]  Speech-Language Pathologist

## 2021-06-02 NOTE — FLOWSHEET NOTE
All morning pt was very lethargic and rested quietly with eyes closed. This afternoon pt woke up and seemed to know what was going on. Now pt is again confused according to  and is repeatedly trying to get out of bed.  Notified Dr. Luba Hough

## 2021-06-02 NOTE — PROGRESS NOTES
Speech Language Pathology    Conception Livings  1970    Attempted to see patient for dysphagia therapy, RN requested therapist return later this date as Pt is sleeping. Will attempt to follow-up as schedule allows. Second attempt : Pt off of floor for testing at this time. Will follow-up as schedule allows.     Kirk Nassar M.A., [address] [place] [address]  Speech-Language Pathologist

## 2021-06-02 NOTE — CONSULTS
Q6H PRN Foreign Gould MD   0.2 mg at 06/02/21 0015    carvedilol (COREG) tablet 12.5 mg  12.5 mg Oral BID  Foreign Gould MD        vitamin B-12 (CYANOCOBALAMIN) tablet 500 mcg  500 mcg Oral Daily Nina Cervantes MD   500 mcg at 06/01/21 0914    lisinopril (PRINIVIL;ZESTRIL) tablet 40 mg  40 mg Oral Daily Nina Cervantes MD   40 mg at 06/01/21 0914    labetalol (NORMODYNE;TRANDATE) injection 10 mg  10 mg Intravenous Q6H PRN Nina Cervantes MD   10 mg at 06/01/21 2315    ferrous sulfate (IRON 325) tablet 325 mg  325 mg Oral BID  Nina Cervantes MD   325 mg at 06/01/21 0914    ampicillin-sulbactam (UNASYN) 1500 mg IVPB minibag  1,500 mg Intravenous Q6H Nina Cervantes MD   Stopped at 06/02/21 0357    sodium chloride flush 0.9 % injection 5-40 mL  5-40 mL Intravenous PRN Kassandra Gomez MD   10 mL at 06/01/21 2210    LORazepam (ATIVAN) tablet 1 mg  1 mg Oral Q1H PRN Kassandra Gomez MD        Or    LORazepam (ATIVAN) injection 1 mg  1 mg Intravenous Q1H PRN Kassandra Gomez MD   1 mg at 06/02/21 0320    Or    LORazepam (ATIVAN) tablet 2 mg  2 mg Oral Q1H PRN Kassandra Gomez MD        Or    LORazepam (ATIVAN) injection 2 mg  2 mg Intravenous Q1H PRN Kassandra Gomez MD   2 mg at 06/01/21 2314    Or    LORazepam (ATIVAN) tablet 3 mg  3 mg Oral Q1H PRN Kassandra Gomez MD        Or    LORazepam (ATIVAN) injection 3 mg  3 mg Intravenous Q1H PRN Kassandra Gomez MD   3 mg at 06/01/21 1532    Or    LORazepam (ATIVAN) tablet 4 mg  4 mg Oral Q1H PRN Kassandra Gomez MD        Or    LORazepam (ATIVAN) injection 4 mg  4 mg Intravenous Q1H PRN Kassandra Gomez MD   4 mg at 06/02/21 9581    sodium chloride flush 0.9 % injection 5-40 mL  5-40 mL Intravenous 2 times per day Nina Cervantes MD   10 mL at 06/01/21 2014    sodium chloride flush 0.9 % injection 5-40 mL  5-40 mL Intravenous PRN Nina Cervantes MD   10 mL at 06/02/21 0321    0.9 % sodium chloride infusion  25 mL Intravenous PRN Melanie Darby MD   Stopped at 06/02/21 0415    enoxaparin (LOVENOX) injection 40 mg  40 mg Subcutaneous Daily Melanie Darby MD   40 mg at 06/02/21 1046    promethazine (PHENERGAN) tablet 12.5 mg  12.5 mg Oral Q6H PRN Melanie Darby MD        Or    ondansetron (ZOFRAN) injection 4 mg  4 mg Intravenous Q6H PRN Melanie Darby MD   4 mg at 06/01/21 0221    polyethylene glycol (GLYCOLAX) packet 17 g  17 g Oral Daily PRN Melanie Draby MD        acetaminophen (TYLENOL) tablet 650 mg  650 mg Oral Q6H PRN Melanie Darby MD   650 mg at 05/30/21 1724    Or    acetaminophen (TYLENOL) suppository 650 mg  650 mg Rectal Q6H PRN Melanie Darby MD        glucose (GLUTOSE) 40 % oral gel 15 g  15 g Oral PRN Katina Larose MD        dextrose 50 % IV solution  12.5 g Intravenous PRN Katina Larose MD        glucagon (rDNA) injection 1 mg  1 mg Intramuscular PRN Katina Larose MD        dextrose 5 % solution  100 mL/hr Intravenous PRN Katina Larose MD           ROS: 10-14 system review was limited due to MS changes or as per HPI. Constitutional:   Vitals:    06/02/21 0324 06/02/21 0611 06/02/21 0721 06/02/21 0900   BP:  126/85  120/78   Pulse: 89 86  85   Resp:    16   Temp:    97.7 °F (36.5 °C)   TempSrc:    Axillary   SpO2:    99%   Weight:   123 lb 11.2 oz (56.1 kg)    Height:           General appearance: Confused   Eye: Fundus of the eye: Optic disc is difficult to obtain due to poor cooperation from the patient  Neck: supple  Cardiovascular: No lower leg edema with good pulsation. Mental Status:   AAO times 0  Poor attention and concentration. Waxing and waning. Unable to assess recent or remote memory due to confusion  Language: Nonfluent   unable to assess fund of knowledge due to confusion. Cranial Nerves:   II: Pupils: equal, round, reactive to light  III,IV,VI: Extra Ocular Movements are intact.  No nystagmus, no gaze preference  V: Facial sensation : Not tested due to confusion  VII: Facial strength and movements: intact and symmetric  VIII: Hearing: Not tested due to confusion  IX: Palate elevation not tested due to confusion  XI: Shoulder shrug: Not tested due to confusion  XII: Tongue movements: midline  Musculoskeletal:  The patient can withdraw to pain. Tone: Normal tone. No rigidity. Reflexes: Symmetric 2+ in both arms and 2+ in the legs. Planters: flexor bilaterally. Coordination: No abnormal movement  Sensation: Patient can withdraw to pain from left and right   Gait/Posture: Cannot be tested due to poor cooperation from the patient. Data:  LABS:   Lab Results   Component Value Date     06/02/2021    K 3.0 06/02/2021     06/02/2021    CO2 27 06/02/2021    BUN 6 06/02/2021    CREATININE 0.5 06/02/2021    GFRAA >60 06/02/2021    LABGLOM >60 06/02/2021    GLUCOSE 146 06/02/2021    MG 1.40 06/02/2021    CALCIUM 8.2 06/02/2021     Lab Results   Component Value Date    WBC 6.7 06/02/2021    RBC 3.28 06/02/2021    HGB 11.1 06/02/2021    HCT 32.5 06/02/2021    MCV 98.9 06/02/2021    RDW 13.0 06/02/2021     06/02/2021   No results found for: INR, PROTIME    EEG was independently reviewed by me. Reviewed notes from different physicians  Reviewed lab and blood testing    Impression:  Acute encephalopathy, severe. So far no specific etiology. Could be acute metabolic encephalopathy from hypoglycemia. Now with persistent encephalopathy over the last few days. Could be from overmedication from polypharmacy and benzodiazepine. Would recommend to at least taper his Librium and avoid using excessive Ativan. Will need CT head noncontrast at least to rule out any ICH or traumatic bleed from his recent encephalopathy. MRI could be helpful but not urgent. EEG showed no evidence of nonconvulsive seizures. So far is not clear whether he drinks daily but hypoglycemia could also be induced from EtOH abuse.   Diabetes, not controlled with hypoglycemia  Anemia  Hypertension  Aspiration pneumonia    Recommendation:  CT head noncontrast for now  Tapered off sedation benzodiazepine gradually insulin sliding scale blood sugar monitor  Aspiration precautions  Telemetry  Hydration  ISS and BS monitor closely  CIWA  DVT and GI prophylaxis  Follow electrolytes and CBC  Continue blood pressure monitor and current medications  Continue antibiotics  Will follow    MDM: High  Discussed with nurse and primary team      Thank you for referring such patient. If you have any questions regarding my consult note, please don't hesitate to call me. Caden Griffin MD  [phone]    This dictation was generated by voice recognition computer software.  Although all attempts are made to edit the dictation for accuracy, there may be errors in the  transcription that are not intended

## 2021-06-02 NOTE — PROGRESS NOTES
Speech Language Pathology  Dysphagia Treatment Note    Name:  Jones Tovar  :   1970  Medical Diagnosis:  Hypoglycemia [E16.2]  Treatment Diagnosis: Oropharyngeal Dysphagia  Pain level: Pt did not report pain    Current Diet Level: NPO except meds crushed in puree     Tolerance of Current Diet Level: Per RN report Pt lethargic this AM and not appropriate for PO meds. Pt more awake during session, however confusion and decreased sustained alertness noted. Assessment of Texture Tolerance:  Pt was seen sitting upright in bed,  services were used as Pt is Mauritian speaking. Pt demonstrated reduced sustained alertness with verbal cues required. Trials of thin liquids, nectar thick liquids, and purees were provided. Pt demonstrated use of 2-3 swallows with all trials with decreased laryngeal elevation noted. Reduced A-P propulsion noted with puree and nectar thick liquids with delayed swallow initiation and suspected reduced pharyngeal clearing. Given thin liquids, suspect premature bolus loss to pharynx with delayed swallow initiation, no immediate s/s of aspiration/penetration were noted however coughing was noted 1-2 minutes post thin liquid trials. When asked Pt if he was having difficulty Pt reported his swallowing seems \"slow\" and Pt also reported difficulty with swallowing prior to admission. Unable to obtain further details due to Pt's confusion and reduced alertness. Overall, Pt continues to be at risk for aspiration and demonstrates signs of reduced pharyngeal clearing. Recommend continuation of NPO with ongoing swallowing assessment. Diet and Treatment Recommendations:   1.) Continue NPO (allow meds crushed in puree when alert)    Dysphagia Goals:  1. Pt will functionally tolerate recommended diet with no overt clinical s/s of aspiration (ongoing 2021)  2.  Pt will demonstrate understanding of aspiration risk and precautions via education/demonstration with occasional prompting (ongoing 6/2/2021)  3. Pt will advance to least restrictive diet as indicated (ongoing 6/2/2021)  4. If clinical s/s of aspiration/penetration continue to be noted, Pt will participate in Modified Barium Swallow Study (ongoing 6/2/2021)    Plan:  Continued daily Dysphagia treatment with goals per plan of care. Patient/Family Education:Education given to the Pt (suspect he will benefit from further repetitions to improve recall/comprehension) and nurse, who verbalized understanding    Discharge Recommendations:  Pt will benefit from continued skilled Speech Therapy for Dysphagia services, prior to returning home. Timed Code Treatment: 0 minutes     Total Treatment Time: 20 minutes     If patient discharges prior to next session this note will serve as a discharge summary.      Chirag Tompkins M.A., [address] [place] [address]  Speech-Language Pathologist

## 2021-06-03 LAB
ANION GAP SERPL CALCULATED.3IONS-SCNC: 18 MMOL/L (ref 3–16)
BASOPHILS ABSOLUTE: 0 K/UL (ref 0–0.2)
BASOPHILS RELATIVE PERCENT: 0.3 %
BLOOD CULTURE, ROUTINE: NORMAL
BUN BLDV-MCNC: 8 MG/DL (ref 7–20)
CALCIUM SERPL-MCNC: 8.4 MG/DL (ref 8.3–10.6)
CHLORIDE BLD-SCNC: 101 MMOL/L (ref 99–110)
CO2: 20 MMOL/L (ref 21–32)
CREAT SERPL-MCNC: 0.6 MG/DL (ref 0.9–1.3)
CULTURE, BLOOD 2: NORMAL
EOSINOPHILS ABSOLUTE: 0.2 K/UL (ref 0–0.6)
EOSINOPHILS RELATIVE PERCENT: 2.5 %
GFR AFRICAN AMERICAN: >60
GFR NON-AFRICAN AMERICAN: >60
GLUCOSE BLD-MCNC: 109 MG/DL (ref 70–99)
GLUCOSE BLD-MCNC: 118 MG/DL (ref 70–99)
GLUCOSE BLD-MCNC: 118 MG/DL (ref 70–99)
GLUCOSE BLD-MCNC: 145 MG/DL (ref 70–99)
GLUCOSE BLD-MCNC: 194 MG/DL (ref 70–99)
HCT VFR BLD CALC: 32.6 % (ref 40.5–52.5)
HEMOGLOBIN: 11.1 G/DL (ref 13.5–17.5)
LYMPHOCYTES ABSOLUTE: 1.4 K/UL (ref 1–5.1)
LYMPHOCYTES RELATIVE PERCENT: 20.5 %
MAGNESIUM: 1.7 MG/DL (ref 1.8–2.4)
MCH RBC QN AUTO: 34 PG (ref 26–34)
MCHC RBC AUTO-ENTMCNC: 34 G/DL (ref 31–36)
MCV RBC AUTO: 99.9 FL (ref 80–100)
MONOCYTES ABSOLUTE: 0.9 K/UL (ref 0–1.3)
MONOCYTES RELATIVE PERCENT: 13.5 %
NEUTROPHILS ABSOLUTE: 4.4 K/UL (ref 1.7–7.7)
NEUTROPHILS RELATIVE PERCENT: 63.2 %
PDW BLD-RTO: 12.7 % (ref 12.4–15.4)
PERFORMED ON: ABNORMAL
PLATELET # BLD: 228 K/UL (ref 135–450)
PMV BLD AUTO: 7.9 FL (ref 5–10.5)
POTASSIUM REFLEX MAGNESIUM: 3.8 MMOL/L (ref 3.5–5.1)
RBC # BLD: 3.26 M/UL (ref 4.2–5.9)
SODIUM BLD-SCNC: 139 MMOL/L (ref 136–145)
WBC # BLD: 6.9 K/UL (ref 4–11)

## 2021-06-03 PROCEDURE — 6370000000 HC RX 637 (ALT 250 FOR IP): Performed by: FAMILY MEDICINE

## 2021-06-03 PROCEDURE — 2580000003 HC RX 258: Performed by: INTERNAL MEDICINE

## 2021-06-03 PROCEDURE — 97530 THERAPEUTIC ACTIVITIES: CPT

## 2021-06-03 PROCEDURE — 99233 SBSQ HOSP IP/OBS HIGH 50: CPT | Performed by: PSYCHIATRY & NEUROLOGY

## 2021-06-03 PROCEDURE — 83735 ASSAY OF MAGNESIUM: CPT

## 2021-06-03 PROCEDURE — 36415 COLL VENOUS BLD VENIPUNCTURE: CPT

## 2021-06-03 PROCEDURE — 2060000000 HC ICU INTERMEDIATE R&B

## 2021-06-03 PROCEDURE — 6370000000 HC RX 637 (ALT 250 FOR IP): Performed by: INTERNAL MEDICINE

## 2021-06-03 PROCEDURE — 97162 PT EVAL MOD COMPLEX 30 MIN: CPT

## 2021-06-03 PROCEDURE — 92523 SPEECH SOUND LANG COMPREHEN: CPT

## 2021-06-03 PROCEDURE — 97535 SELF CARE MNGMENT TRAINING: CPT

## 2021-06-03 PROCEDURE — 85025 COMPLETE CBC W/AUTO DIFF WBC: CPT

## 2021-06-03 PROCEDURE — 6360000002 HC RX W HCPCS: Performed by: FAMILY MEDICINE

## 2021-06-03 PROCEDURE — 2580000003 HC RX 258: Performed by: FAMILY MEDICINE

## 2021-06-03 PROCEDURE — 94760 N-INVAS EAR/PLS OXIMETRY 1: CPT

## 2021-06-03 PROCEDURE — 99253 IP/OBS CNSLTJ NEW/EST LOW 45: CPT | Performed by: PSYCHIATRY & NEUROLOGY

## 2021-06-03 PROCEDURE — 97166 OT EVAL MOD COMPLEX 45 MIN: CPT

## 2021-06-03 PROCEDURE — 6360000002 HC RX W HCPCS: Performed by: INTERNAL MEDICINE

## 2021-06-03 PROCEDURE — 97112 NEUROMUSCULAR REEDUCATION: CPT

## 2021-06-03 PROCEDURE — 80048 BASIC METABOLIC PNL TOTAL CA: CPT

## 2021-06-03 PROCEDURE — 92526 ORAL FUNCTION THERAPY: CPT

## 2021-06-03 RX ORDER — MAGNESIUM SULFATE IN WATER 40 MG/ML
2000 INJECTION, SOLUTION INTRAVENOUS ONCE
Status: COMPLETED | OUTPATIENT
Start: 2021-06-03 | End: 2021-06-03

## 2021-06-03 RX ADMIN — MAGNESIUM SULFATE HEPTAHYDRATE 2000 MG: 40 INJECTION, SOLUTION INTRAVENOUS at 11:10

## 2021-06-03 RX ADMIN — SODIUM CHLORIDE 1500 MG: 900 INJECTION INTRAVENOUS at 13:51

## 2021-06-03 RX ADMIN — ENOXAPARIN SODIUM 40 MG: 40 INJECTION SUBCUTANEOUS at 11:17

## 2021-06-03 RX ADMIN — PRAVASTATIN SODIUM 40 MG: 40 TABLET ORAL at 11:17

## 2021-06-03 RX ADMIN — GABAPENTIN 100 MG: 100 CAPSULE ORAL at 11:18

## 2021-06-03 RX ADMIN — Medication 10 ML: at 22:55

## 2021-06-03 RX ADMIN — GABAPENTIN 100 MG: 100 CAPSULE ORAL at 14:23

## 2021-06-03 RX ADMIN — AMITRIPTYLINE HYDROCHLORIDE 50 MG: 50 TABLET, FILM COATED ORAL at 22:45

## 2021-06-03 RX ADMIN — FLUOXETINE 20 MG: 20 CAPSULE ORAL at 11:17

## 2021-06-03 RX ADMIN — FERROUS SULFATE TAB 325 MG (65 MG ELEMENTAL FE) 325 MG: 325 (65 FE) TAB at 11:18

## 2021-06-03 RX ADMIN — MAGNESIUM GLUCONATE 500 MG ORAL TABLET 400 MG: 500 TABLET ORAL at 11:18

## 2021-06-03 RX ADMIN — LISINOPRIL 10 MG: 10 TABLET ORAL at 11:19

## 2021-06-03 RX ADMIN — CYANOCOBALAMIN TAB 1000 MCG 500 MCG: 1000 TAB at 11:18

## 2021-06-03 RX ADMIN — SODIUM CHLORIDE 1500 MG: 900 INJECTION INTRAVENOUS at 00:04

## 2021-06-03 RX ADMIN — THIAMINE HYDROCHLORIDE 100 MG: 100 INJECTION, SOLUTION INTRAMUSCULAR; INTRAVENOUS at 16:38

## 2021-06-03 RX ADMIN — FOLIC ACID 1 MG: 1 TABLET ORAL at 11:19

## 2021-06-03 RX ADMIN — MAGNESIUM GLUCONATE 500 MG ORAL TABLET 400 MG: 500 TABLET ORAL at 22:45

## 2021-06-03 RX ADMIN — INSULIN LISPRO 2 UNITS: 100 INJECTION, SOLUTION INTRAVENOUS; SUBCUTANEOUS at 22:44

## 2021-06-03 RX ADMIN — Medication 10 ML: at 16:38

## 2021-06-03 RX ADMIN — SODIUM CHLORIDE 1500 MG: 900 INJECTION INTRAVENOUS at 06:13

## 2021-06-03 RX ADMIN — LEVOTHYROXINE SODIUM 50 MCG: 0.03 TABLET ORAL at 11:19

## 2021-06-03 RX ADMIN — GABAPENTIN 100 MG: 100 CAPSULE ORAL at 22:45

## 2021-06-03 RX ADMIN — SODIUM CHLORIDE 1500 MG: 900 INJECTION INTRAVENOUS at 18:26

## 2021-06-03 RX ADMIN — POTASSIUM CHLORIDE AND SODIUM CHLORIDE: 450; 150 INJECTION, SOLUTION INTRAVENOUS at 18:19

## 2021-06-03 RX ADMIN — INSULIN LISPRO 2 UNITS: 100 INJECTION, SOLUTION INTRAVENOUS; SUBCUTANEOUS at 18:20

## 2021-06-03 ASSESSMENT — PAIN SCALES - WONG BAKER
WONGBAKER_NUMERICALRESPONSE: 0

## 2021-06-03 ASSESSMENT — PAIN SCALES - GENERAL
PAINLEVEL_OUTOF10: 0

## 2021-06-03 NOTE — PROGRESS NOTES
He was admitted. Over the last few days, he remained confused and agitated. He received DT precaution with Ativan and Librium. Now is waxing and waning. Opens eyes to voice and goes back to sleep. There has been some conflicting report from friends about his drinking habits. His UDS on admission was negative. Blood test today was unremarkable except for his anemia. He had his EEG yesterday which showed no epileptiform discharges. Other review of system was limited     Vision/Hearing  Vision: Impaired  Vision Exceptions: Wears glasses at all times  Hearing: Within functional limits       Subjective  General  Chart Reviewed: Yes  Patient assessed for rehabilitation services?: Yes  Family / Caregiver Present: No  Follows Commands: Impaired (Limited by cognition and language (improves with time and use of ))  Subjective  Subjective: Pt lying supine in bed and lethargic upon therapist arrival. Pt more alert by end of session. Pt speaks some English but requires use of  for full conversations. Pain Screening  Patient Currently in Pain: No  Vital Signs  Patient Currently in Pain: No       Orientation  Orientation  Overall Orientation Status: Impaired  Orientation Level: Oriented to person;Disoriented to place; Disoriented to time;Disoriented to situation     Social/Functional History  Social/Functional History  Lives With: Family (brother)  Type of Home: House  Home Layout: Two level  Home Access:  (pt reports he has steps to enter the home, unsure of how many)  Bathroom Shower/Tub: Tub/Shower unit  Bathroom Equipment: Shower chair, Grab bars in shower  ADL Assistance: [address]: Independent  Transfer Assistance: Independent  Active : No  Type of occupation: pt reports he does not work  Additional Comments: difficulty obtaining PLOF from pt, language line used,  reporting \"I do not always understand the pt. \" pt reports he has had falls in the last 6 months. pt reports he lost consciousness and he was seen at the hospital     Cognition   Cognition  Overall Cognitive Status: Exceptions  Arousal/Alertness: Delayed responses to stimuli;Inconsistent responses to stimuli  Following Commands: Inconsistently follows commands  Attention Span: Attends with cues to redirect; Difficulty attending to directions; Unable to maintain attention  Memory: Decreased recall of recent events;Decreased short term memory  Safety Judgement: Decreased awareness of need for safety  Problem Solving: Decreased awareness of errors  Insights: Not aware of deficits  Initiation: Requires cues for all  Sequencing: Requires cues for all    Objective  PROM RLE (degrees)  RLE PROM: WFL  AROM RLE (degrees)  RLE General AROM: Unable to assess due to poor command following and poor sitting balance  PROM LLE (degrees)  LLE PROM: WFL  AROM LLE (degrees)  LLE General AROM: Unable to assess due to poor command following and poor sitting balance     Strength RLE  Comment: Unable to assess due to poor command following and poor sitting balance  Strength LLE  Comment: Unable to assess due to poor command following and poor sitting balance     Tone RLE  RLE Tone: Not tested (deferred due to poor sitting balance)  Tone LLE  LLE Tone: Not tested (deferred due to poor sitting balance)  Coordination  Finger to Nose: Dysmetric (WFL on R UE considering pt did not have his glasses. Pt unable to complete on his L side despite multiple attempts)     Sensation  Overall Sensation Status: WFL     Bed mobility  Rolling to Right: Dependent/Total  Supine to Sit: 2 Person assistance (total assist of 2)  Sit to Supine: 2 Person assistance (total assist of 2)  Scootin Person assistance (Total A of 2 to HOB and sitting EOB)     Ambulation/Transfers  Assessment deferred due to poor sitting balance and poor command following.     Balance  Posture: Poor (Forward head and L forward/lateral trunk flexion. Able to assist pt to correct to midline for only 1-2 sec.)  Sitting - Static: Poor (Mod-Max A sitting EOB x10 minutes, pt pushing to L and forward with R UE.)        Plan   Plan  Times per week: 5-7x  Current Treatment Recommendations: Strengthening, ROM, Balance Training, Transfer Training, Endurance Training, ADL/Self-care Training, Functional Mobility Training, Gait Training, Cognitive/Perceptual Training, Neuromuscular Re-education, Safety Education & Training, Cognitive Reorientation  Safety Devices  Type of devices: All fall risk precautions in place, Bed alarm in place, Left in bed, Telesitter in use, Nurse notified, Patient at risk for falls, Sitter present  Restraints  Initially in place: Yes  Restraints: All seizure precautions in place with bedrails up x4, seizure pads on, and telesitter in room.     AM-PAC Score  AM-PAC Inpatient Mobility Raw Score : 6 (06/03/21 1033)  AM-PAC Inpatient T-Scale Score : 23.55 (06/03/21 1033)  Mobility Inpatient CMS 0-100% Score: 100 (06/03/21 1033)  Mobility Inpatient CMS G-Code Modifier : CN (06/03/21 1033)       Goals  Short term goals  Time Frame for Short term goals: Before discharge  Short term goal 1: Pt will complete bed mobility with Mod A  Short term goal 2: Pt will sit EOB x10 minutes with no more than Min A  Short term goal 3: Pt will complete sit<>stand with Mod A of 2 persons  Short term goal 4: Pt will transfer bed<>chair with Mod A of 2 persons and LRAD  Patient Goals   Patient goals : None stated       Therapy Time   Individual Concurrent Group Co-treatment   Time In 0818         Time Out 0904         Minutes 46         Timed Code Treatment Minutes: [address], PT, DPT #[id number]

## 2021-06-03 NOTE — PROGRESS NOTES
Neelam Porras  Neurology Follow-up  [facility]    Date of Service: 6/3/2021    Subjective:   CC: Follow up today regarding: Acute encephalopathy    Events noted. Chart and lab reviewed. The patient looks much better today. He is more awake and alert. He did admit that he drinks daily. He denies any headache today or dysphagia or dysarthria. Generalized fatigue and weakness. CT head showed no acute findings. Other review of system was unremarkable       ROS : A 10-12 system review obtained and updated today and is unremarkable except as mentioned  in my interval history.      Family history: Noncontributory    Past Medical History:   Diagnosis Date    Diabetes mellitus ([facility] [address]      Current Facility-Administered Medications   Medication Dose Route Frequency Provider Last Rate Last Admin    magnesium sulfate 2000 mg in 50 mL IVPB premix  2,000 mg Intravenous Once Barron Gosselin, MD 25 mL/hr at 06/03/21 1110 2,000 mg at 06/03/21 1110    ampicillin-sulbactam (UNASYN) 1500 mg IVPB minibag  1,500 mg Intravenous Q6H Barron Gosselin, MD        magnesium oxide (MAG-OX) tablet 400 mg  400 mg Oral BID Barron Gosselin, MD   400 mg at 06/03/21 1118    potassium chloride (KLOR-CON M) extended release tablet 40 mEq  40 mEq Oral PRN Barron Gosselin, MD        Or    potassium bicarb-citric acid (EFFER-K) effervescent tablet 40 mEq  40 mEq Oral PRN Barron Gosselin, MD        Or    potassium chloride 10 mEq/100 mL IVPB (Peripheral Line)  10 mEq Intravenous PRN Barron Gosselin,  mL/hr at 06/02/21 1550 10 mEq at 06/02/21 1550    lisinopril (PRINIVIL;ZESTRIL) tablet 10 mg  10 mg Oral Daily Barron Gosselin, MD   10 mg at 06/03/21 1119    0.45 % NaCl with KCl 20 mEq infusion   Intravenous Continuous Barron Gosselin, MD 75 mL/hr at 06/03/21 0725 Restarted at 06/03/21 0725    levothyroxine (SYNTHROID) tablet 50 mcg  50 mcg Oral QAM AC Barron Gosselin, MD   50 mcg at 06/03/21 1119    insulin lispro (1 Unit Dial) 0-12 Units  0-12 Units Subcutaneous Q6H Kelechi Kaye MD   2 Units at 69/26/52 8881    folic acid (FOLVITE) tablet 1 mg  1 mg Oral Daily Kelechi Kaye MD   1 mg at 06/03/21 1119    thiamine (B-1) injection 100 mg  100 mg Intravenous Daily Kelechi Kaye MD   100 mg at 06/02/21 1045    gabapentin (NEURONTIN) capsule 100 mg  100 mg Oral TID Kelechi Kaye MD   100 mg at 06/03/21 1118    FLUoxetine (PROZAC) capsule 20 mg  20 mg Oral Daily Kelechi Kaye MD   20 mg at 06/03/21 1117    pravastatin (PRAVACHOL) tablet 40 mg  40 mg Oral Daily Kelechi Kaye MD   40 mg at 06/03/21 1117    amitriptyline (ELAVIL) tablet 50 mg  50 mg Oral Nightly Kelechi Kaye MD        cloNIDine (CATAPRES) tablet 0.2 mg  0.2 mg Oral Q6H PRN Kelechi Kaye MD   0.2 mg at 06/02/21 0015    vitamin B-12 (CYANOCOBALAMIN) tablet 500 mcg  500 mcg Oral Daily Sangeeta Leong MD   500 mcg at 06/03/21 1118    labetalol (NORMODYNE;TRANDATE) injection 10 mg  10 mg Intravenous Q6H PRN Sangeeta Leong MD   10 mg at 06/01/21 2315    ferrous sulfate (IRON 325) tablet 325 mg  325 mg Oral BID WC Sangeeta Leong MD   325 mg at 06/03/21 1118    sodium chloride flush 0.9 % injection 5-40 mL  5-40 mL Intravenous PRN Gianluca Torre MD   10 mL at 06/01/21 2210    LORazepam (ATIVAN) tablet 1 mg  1 mg Oral Q1H PRN Gianluca Torre MD        Or    LORazepam (ATIVAN) injection 1 mg  1 mg Intravenous Q1H PRN Gianluca Torre MD   1 mg at 06/02/21 0320    Or    LORazepam (ATIVAN) tablet 2 mg  2 mg Oral Q1H PRN Gianluca Torre MD        Or    LORazepam (ATIVAN) injection 2 mg  2 mg Intravenous Q1H PRN Gianluca Torre MD   2 mg at 06/01/21 2314    Or    LORazepam (ATIVAN) tablet 3 mg  3 mg Oral Q1H PRN Gianluca Torre MD        Or    LORazepam (ATIVAN) injection 3 mg  3 mg Intravenous Q1H PRN Gianluca Torre MD   3 mg at 06/01/21 1537    Or    LORazepam (ATIVAN) tablet 4 mg  4 mg Oral Q1H PRN Shwetha Gray MD        Or    LORazepam (ATIVAN) injection 4 mg  4 mg Intravenous Q1H PRN Shwetha Gray MD   4 mg at 06/02/21 3113    sodium chloride flush 0.9 % injection 5-40 mL  5-40 mL Intravenous 2 times per day Eber Wilson MD   10 mL at 06/02/21 1617    sodium chloride flush 0.9 % injection 5-40 mL  5-40 mL Intravenous PRN Eber Wilson MD   10 mL at 06/02/21 0321    0.9 % sodium chloride infusion  25 mL Intravenous PRN Eber Wilson MD   Stopped at 06/02/21 0415    enoxaparin (LOVENOX) injection 40 mg  40 mg Subcutaneous Daily Eber Wilson MD   40 mg at 06/03/21 1117    promethazine (PHENERGAN) tablet 12.5 mg  12.5 mg Oral Q6H PRN Eber Wilson MD        Or    ondansetron (ZOFRAN) injection 4 mg  4 mg Intravenous Q6H PRN Eber Wilson MD   4 mg at 06/01/21 0221    polyethylene glycol (GLYCOLAX) packet 17 g  17 g Oral Daily PRN Eber Wilson MD        acetaminophen (TYLENOL) tablet 650 mg  650 mg Oral Q6H PRN Eber Wilsno MD   650 mg at 05/30/21 1724    Or    acetaminophen (TYLENOL) suppository 650 mg  650 mg Rectal Q6H PRN Eber Wilson MD        glucose (GLUTOSE) 40 % oral gel 15 g  15 g Oral PRN Kyle Ramirez MD        dextrose 50 % IV solution  12.5 g Intravenous PRN Kyle Ramirez MD        glucagon (rDNA) injection 1 mg  1 mg Intramuscular PRN Kyle Ramirez MD        dextrose 5 % solution  100 mL/hr Intravenous PRN Kyle Ramirez MD         No Known Allergies   reports that he has never smoked. He does not have any smokeless tobacco history on file. He reports current alcohol use. He reports that he does not use drugs.        Objective:  Exam:   Constitutional:   Vitals:    06/03/21 0800 06/03/21 0815 06/03/21 1110 06/03/21 1119   BP: 122/76   (!) 160/84   Pulse: 102      Resp: 18  20    Temp: 98.5 °F (36.9 °C)      TempSrc: Axillary      SpO2: 94%  96%    Weight:  121 lb 8 oz (55.1 kg) Height:         General appearance: Waxing and waning  Mental Status:   AAO x1  Poor attention and concentration  Language is fluent  Poor fund of knowledge and immediate recall  Intact remote memory. Cranial Nerves:   II: Visual fields: Full. Pupils: equal, round, reactive to light  III,IV,VI: Extra Ocular Movements are intact. No nystagmus  V: Facial sensation is intact  VII: Facial strength and movements: intact and symmetric  IX: Palate elevation is symmetric  XI: Shoulder shrug is intact  XII: Tongue movements are normal  Musculoskeletal: Poor effort and generalized diffuse weakness 4/5  Normal tone  Symmetric DTRs  No sensory loss  No tremors  Gait cannot be tested      Data:  LABS:   Lab Results   Component Value Date     06/03/2021    K 3.8 06/03/2021     06/03/2021    CO2 20 06/03/2021    BUN 8 06/03/2021    CREATININE 0.6 06/03/2021    GFRAA >60 06/03/2021    LABGLOM >60 06/03/2021    GLUCOSE 118 06/03/2021    MG 1.70 06/03/2021    CALCIUM 8.4 06/03/2021     Lab Results   Component Value Date    WBC 6.9 06/03/2021    RBC 3.26 06/03/2021    HGB 11.1 06/03/2021    HCT 32.6 06/03/2021    MCV 99.9 06/03/2021    RDW 12.7 06/03/2021     06/03/2021   No results found for: INR, PROTIME    Neuroimaging was independently reviewed by me and discussed results with the patient  I reviewed blood testing and other test results and discussed results with the patient      Impression:  Acute encephalopathy, severe.   Likely acute metabolic encephalopathy, multifactorial  Diabetes with hypoglycemia  Alcohol abuse  Generalized weakness and fatigue  Aspiration pneumonia  Hypertension      Recommendation  Continue DT precautions  Limit sedation and benzodiazepine  PT and OT  Aspiration precaution  Speech evaluation  MRI brain  Insulin sliding scale  Blood sugar control  Continue antibiotics  Telemetry  DVT and GI prophylaxis  Aspirin for stroke prevention  Continue home SSRI  We will follow Dedrick Crook MD   [phone]      This dictation was generated by voice recognition computer software. Although all attempts are made to edit the dictation for accuracy, there may be errors in the transcription that are not intended.

## 2021-06-03 NOTE — CONSULTS
PSYCHIATRY CONSULT, INITIAL EVALUATION    Referring Provider:  Danny Martell MD    CC/Reason for Consult: AMS, agitation      ASSESSMENT:   46 yo M here with AMS after having hypoglycemic episode and suspected element of ETOH withdrawal. With PNA as well likely contributing to some delirium. No hx of underlying psychiatric condition that may be contributing. I'm not sure if I have anything additional to add from a psychiatric standpoint. 1. Delirium d/t multiple potential etiologies  2. Alcohol withdrawal  3. Bilateral pneumonia  4. DM type II    RECOMMENDATIONS:   1. Agree with de-escalating benzodiazepines in case he had gotten too much and this has contributed  2. Continue thiamine, Vitamin B12, thyroid hormone replacement  3. Continue PNA treatment  4. May take additional time for delirium to clear    Dispo: does not require inpatient psych admission      Thank you for this consult, please call the psychiatry consult line for further questions. I will sign off at this time. ____________________________________________________________________________    HPI:   context: 46 yo M with no past psych hx , hx of alcohol abuse, DM, presented after being found by a friend slumped over unconscious around 5:30am. Had 4 beer prior. Was found to be hypoglycemic. He has been treated for his hypoglycemia, alcohol withdrawal, and continues to have persisting encephalopathy. Neurology and has been following. associated symptoms:   Pt is not able to communicate much at this time. History obtained from friend at bedside - he lives with her and they have known each other for nearly 21 yrs. She reports pt at baseline is pleasant, conversant, without any behavioral issues or mood issues. He has been a little more debilitated, having balance issues and occasional falls over the last 1 yr and has not been able to work over the last 1 yr. He has no hx of psychosis, hallucinations, or mental health issues.  He does take paxil and elavil, has bad neuropathy d/t his DM, and has had prior episodes of severe hypoglycemia. He he has had waxing and waning confusion and agitation. Today he is a little more alert than yesterday. modifying factors: He drinks per friend's report no more than 4 beers per day. Seems he used to drink harder drinks, but she doesn't allow this in the home anymore. Timing: acute on chronic  duration: a couple days  severity: severe    ROS:   Unable to obtain d/t AMS    Past Psychiatric History:   No prior psychiatric treatment history per friend's report  He is on paxil, elavil as outpatient. Gabapentin 300mg BID for neuropathy. Substance Use History:   Nicotine: denies   Alcohol: drinks 2-4 beers per day   Illicits: denies  OARRS does not show any opioids or benzodiazepines prescriptions    Past Medical History:   Past Medical History:   Diagnosis Date    Diabetes mellitus ([facility] [address]      No past surgical history on file. Social/Developmental History:    Relationship: single   Children: has children who live in [address] [place] [address]: lives with a friend and her family. Occ/Inc: has not worked over the last 1 yr    Family History:   No family history on file. Psychiatric: none known per family friend    Allergies:  No Known Allergies    Home Medications:   No current facility-administered medications on file prior to encounter. Current Outpatient Medications on File Prior to Encounter   Medication Sig Dispense Refill    metFORMIN (GLUCOPHAGE) 1000 MG tablet Take 1,000 mg by mouth 2 times daily      pravastatin (PRAVACHOL) 40 MG tablet Take 40 mg by mouth nightly      amitriptyline (ELAVIL) 100 MG tablet Take 100 mg by mouth nightly      gabapentin (NEURONTIN) 300 MG capsule Take 1 capsule by mouth 2 times daily.       glipiZIDE (GLUCOTROL) 10 MG tablet Take 10 mg by mouth 2 times daily      PARoxetine (PAXIL) 20 MG tablet Take 20 mg by mouth every morning      gabapentin (NEURONTIN) 100 MG capsule Take 100 mg by mouth 3 times daily      lisinopril (PRINIVIL;ZESTRIL) 20 MG tablet Take 20 mg by mouth daily      pravastatin (PRAVACHOL) 40 MG tablet Take 40 mg by mouth daily      FLUoxetine (PROZAC) 20 MG capsule Take 20 mg by mouth daily      amitriptyline (ELAVIL) 100 MG tablet Take 100 mg by mouth nightly      metFORMIN (GLUCOPHAGE) 1000 MG tablet Take 1,000 mg by mouth daily (with breakfast)      GLYBURIDE PO Take by mouth      naproxen (NAPROSYN) 500 MG tablet Take 1 tablet by mouth 2 times daily for 20 doses 20 tablet 0       Medications:  Scheduled Meds:   ampicillin-sulbactam  1,500 mg Intravenous Q6H    magnesium oxide  400 mg Oral BID    lisinopril  10 mg Oral Daily    levothyroxine  50 mcg Oral QAM AC    insulin lispro  0-12 Units Subcutaneous I3V    folic acid  1 mg Oral Daily    thiamine  100 mg Intravenous Daily    gabapentin  100 mg Oral TID    FLUoxetine  20 mg Oral Daily    pravastatin  40 mg Oral Daily    amitriptyline  50 mg Oral Nightly    vitamin B-12  500 mcg Oral Daily    ferrous sulfate  325 mg Oral BID WC    sodium chloride flush  5-40 mL Intravenous 2 times per day    enoxaparin  40 mg Subcutaneous Daily     PRN Meds:.potassium chloride **OR** potassium alternative oral replacement **OR** potassium chloride, cloNIDine, labetalol, sodium chloride flush, LORazepam **OR** LORazepam **OR** LORazepam **OR** LORazepam **OR** LORazepam **OR** LORazepam **OR** LORazepam **OR** LORazepam, sodium chloride flush, sodium chloride, promethazine **OR** ondansetron, polyethylene glycol, acetaminophen **OR** acetaminophen, glucose, dextrose, glucagon (rDNA), dextrose    OBJECTIVE:  .  Vitals:    06/03/21 1110 06/03/21 1119 06/03/21 1200 06/03/21 1333   BP:  (!) 160/84 (!) 145/79    Pulse:   111    Resp: 20  18    Temp:   98.3 °F (36.8 °C)    TempSrc:   Axillary    SpO2: 96%  94%    Weight:       Height:    5' 4\" (1.626 m)       MSE:   Appearance    Drowsy, minimally alert  Motor: [place] Muscle are a little stiff, +PMR, No abnormal movements, tics or mannerisms. Speech    Slow, minimal. Gives a couple one word responses  Mood/Affect    Unable to assess / depressed affect  Thought Process    Difficult to assess d/t minimal participation  Thought Content  Paucity of thought  Attention/Concentration    impaired  Orientation   Not oriented to plan.  Doesn't answer any time questions  Memory    Unable to assess d/t participation  Fund of Knowledge    impaired  Insight/Judgement    Poor / Impaired    Labs:   Recent Labs     06/01/21  0434 06/02/21  0435 06/03/21  0414   WBC 9.7 6.7 6.9   HGB 11.0* 11.1* 11.1*   HCT 32.6* 32.5* 32.6*   MCV 99.1 98.9 99.9    220 228     Recent Labs     06/01/21  0434 06/02/21 0435 06/02/21  1737 06/03/21  0414   * 144  --  139   K 3.5 3.0* 3.8 3.8    103  --  101   CO2 28 27  --  20*   BUN 6* 6*  --  8   MG 1.80 1.40*  --  1.70*     Recent Labs     06/02/21 0435   AST 28   ALT 13      Lab Results   Component Value Date    COLORU YELLOW 05/30/2021    NITRU Negative 05/30/2021    GLUCOSEU Negative 05/30/2021    KETUA Negative 05/30/2021    UROBILINOGEN 0.2 05/30/2021    BILIRUBINUR Negative 05/30/2021     Lab Results   Component Value Date    LABA1C 6.5 05/29/2021     Lab Results   Component Value Date    .9 05/29/2021     Lab Results   Component Value Date    CVMJBSLO12 254 05/29/2021     Lab Results   Component Value Date    FOLATE >20.00 05/29/2021     TSH 5/29/2021: 5/89, Free T4 0.8    Last Drug screen: 6/1/2021: negative    Imaging:   CT Head 6/2/2021:      FINDINGS:   BRAIN/VENTRICLES: There is no acute intracranial hemorrhage, mass effect or   midline shift.  No abnormal extra-axial fluid collection.  The gray-white   differentiation is maintained without evidence of an acute infarct.  There is   no evidence of hydrocephalus.       ORBITS: The visualized portion of the orbits demonstrate no acute abnormality.       SINUSES: The visualized paranasal sinuses and mastoid air cells demonstrate   no acute abnormality.       SOFT TISSUES/SKULL:  No acute abnormality of the visualized skull or soft   tissues.           Impression   No acute intracranial abnormality. EEG 6/1/2021:   Findings: The background of the EEG showed normal alpha posterior   background of 8-9  HZ and amplitude of 20-40 UV. This background   was symmetric, waxing and waning, and reactive with eye opening   and closure. As the patient became drowsy, generalized diffuse   slowing was seen through recording at 6-7 HZ.  This generalized   slowing was symmetric, non rhythmical, and continuous. No spike   or sharp waves were seen.  Photic stimulation did not activate   EEG.     Impression: This EEG  is within normal limits.  There is no evidence of   epileptiform discharges, focal, or lateralizing abnormalities.         EKG:   No EKG on file        Jayashree Bales MD   Psychiatrist

## 2021-06-03 NOTE — PLAN OF CARE
Pt lethargic through beginning of shift only responding to touch, hs medications held, early this AM pt became more awake, oriented to self only, follows commands/calm/cooperative. Pt speaking small bits of English this AM. IV fluids infusing, sitter and camera at bedside. No other needs at this time. Vitals are stable, call light in reach. Fall precautions in place.    Vitals:    06/02/21 2257   BP: 108/69   Pulse: 96   Resp: 18   Temp: 96.9 °F (36.1 °C)   SpO2: 93%       Problem: Pain:  Goal: Pain level will decrease  Description: Pain level will decrease  Outcome: Ongoing  Goal: Control of acute pain  Description: Control of acute pain  Outcome: Ongoing  Goal: Control of chronic pain  Description: Control of chronic pain  Outcome: Ongoing     Problem: Skin Integrity:  Goal: Will show no infection signs and symptoms  Description: Will show no infection signs and symptoms  Outcome: Ongoing  Goal: Absence of new skin breakdown  Description: Absence of new skin breakdown  Outcome: Ongoing     Problem: Falls - Risk of:  Goal: Will remain free from falls  Description: Will remain free from falls  Outcome: Ongoing  Goal: Absence of physical injury  Description: Absence of physical injury  Outcome: Ongoing     Problem: Discharge Planning:  Goal: Discharged to appropriate level of care  Description: Discharged to appropriate level of care  Outcome: Ongoing

## 2021-06-03 NOTE — PROGRESS NOTES
Occupational Therapy   Occupational Therapy Initial Assessment  Date: 6/3/2021   Patient Name: Pankaj Akins  MRN: [id number]     : 1970    Date of Service: 6/3/2021    Discharge Recommendations:  Pankaj Akins scored a 7/24 on the AM-PAC ADL Inpatient form. Current research shows that an AM-PAC score of 17 or less is typically not associated with a discharge to the patient's home setting. Based on the patient's AM-PAC score and their current ADL deficits, it is recommended that the patient have 3-5 sessions per week of Occupational Therapy at d/c to increase the patient's independence. Please see assessment section for further patient specific details. If patient discharges prior to next session this note will serve as a discharge summary. Please see below for the latest assessment towards goals. OT Equipment Recommendations  Equipment Needed: No (continue to assess with progress)    Assessment   Performance deficits / Impairments: Decreased functional mobility ; Decreased ADL status; Decreased balance;Decreased strength;Decreased high-level IADLs;Decreased safe awareness;Decreased cognition;Decreased coordination;Decreased posture  Assessment: pt not at baseline level of functioning, difficulty with assessing pt d/t language barrier and decreased cognition. lethargic upon arrival, becoming more alert with EOB sitting. pt not at baseline and would benefit from ongoing skilled OT services in order to return to PLOF  Treatment Diagnosis: MRI pending, hypoglycemia  Prognosis: Good;Fair  Decision Making: Medium Complexity  History: pt lives with family, independent at baseline.  poor historian and difficulty obtaining PLOF d/t language/cognition this date  Assistance / Modification: assist of 1-2  Patient Education: eval, POC, discharge, orientation-pt is not independent at baseline  Barriers to Learning: cognition, language  REQUIRES OT FOLLOW UP: Yes  Activity Tolerance  Activity Tolerance: Patient Tolerated treatment well;Treatment limited secondary to decreased cognition  Safety Devices  Safety Devices in place: Yes  Type of devices: All fall risk precautions in place;Nurse notified;Call light within reach; Bed alarm in place; Left in bed;Patient at risk for falls  Restraints  Initially in place: Yes  Restraints: telesitter present, seizure precautions in place, all 4 bedrails up-nursing aide/sitter present           Patient Diagnosis(es): The primary encounter diagnosis was Hypoglycemia. Diagnoses of Alcohol abuse and Hypoglycemia secondary to sulfonylurea, accidental or unintentional, sequela were also pertinent to this visit. has a past medical history of Diabetes mellitus ([facility] [address] has no past surgical history on file. Treatment Diagnosis: MRI pending, hypoglycemia      Restrictions  Restrictions/Precautions  Restrictions/Precautions: Fall Risk, Seizure (high fall risk, NPO)  Position Activity Restriction  Other position/activity restrictions: The patient is a 45y.o.  years old male with history of diabetes who was admitted to the hospital few days ago with acute encephalopathy. He was found unresponsive and unconscious in his own apartment by his friend. Unclear duration but according to report the patient was drinking few beers on the day of the incident. His friend went outside and came back and found him unresponsive. Degree was severe. Other associated symptoms included low blood sugar level when he arrived to the ED. No witnessed seizure-like activity, tongue biting or bladder incontinence. No recent fever chills or head trauma. Degree was severe. Unclear duration. No other relieving or aggravating factors. Initial work-up in the ED with metabolic blood testing was unremarkable except for anemia and fluctuating blood sugar level. He was admitted. Over the last few days, he remained confused and agitated. He received DT precaution with Ativan and Librium.   Now is waxing and waning. Opens eyes to voice and goes back to sleep. There has been some conflicting report from friends about his drinking habits. His UDS on admission was negative. Blood test today was unremarkable except for his anemia. He had his EEG yesterday which showed no epileptiform discharges. Other review of system was limited    Subjective   General  Chart Reviewed: Yes  Patient assessed for rehabilitation services?: Yes  Additional Pertinent Hx: [place] speaking, language line used  Family / Caregiver Present: No  Diagnosis: hypoglycemia  Subjective  Subjective: used language line with Keyur Askew # [id number]. pt lethargic upon arrival, telesitter present. pt  oriented x 1. denies pain and does not appear to be in pain. Patient Currently in Pain: No  Pain Assessment  Pain Assessment: Faces  Disla-Matos Pain Rating: No hurt  Vital Signs  Patient Currently in Pain: No  Social/Functional History  Social/Functional History  Lives With: Family (brother)  Type of Home: House  Home Layout: Two level  Home Access:  (pt reports he has steps to enter the home, unsure of how many)  Bathroom Shower/Tub: Tub/Shower unit  Bathroom Equipment: Shower chair, Grab bars in shower  ADL Assistance: Independent  Homemaking Assistance: Independent  Ambulation Assistance: Independent  Transfer Assistance: Independent  Active : No  Type of occupation: pt reports he does not work  Additional Comments: difficulty obtaining PLOF from pt, language line used,  reporting \"I do not always understand the pt. \" pt reports he has had falls in the last 6 months.  pt reports he lost consciousness and he was seen at the hospital        BP taken prior to bed mobility: 151/85   BPM    Objective   Vision: Impaired  Vision Exceptions: Wears glasses at all times  Hearing: Within functional limits    Orientation  Overall Orientation Status: Impaired  Orientation Level: Oriented to person  Observation/Palpation  Posture: Poor  Observation: L Lateral lean, pushing with the R hand  Balance  Sitting Balance: Maximum assistance (varies from maxA to modA)  Standing Balance: Unable to assess(comment) (d/t level of assist with sitting and pushing, unsafe to stand at this time)  Standing Balance  Time: ~10 minutes  Activity: varied from modA to maxA for sitting balance. attempted ADL bathing, maxA to total assist  ADL  Feeding: NPO  Grooming: Maximum assistance (hand over hand)  UE Bathing: Dependent/Total (donning gown)  LE Bathing: Dependent/Total  UE Dressing: Dependent/Total  LE Dressing: Dependent/Total (donning socks)  Additional Comments: pt edge of bed for ADLs, pushing with R hand, L lateral lean with modA to maxA for balance. Tone RUE  RUE Tone: Normotonic  Tone LUE  LUE Tone: Normotonic  Coordination  Movements Are Fluid And Coordinated: No  Coordination and Movement description: Fine motor impairments;Right UE;Left UE;Gross motor impairments     Bed mobility  Rolling to Right: Dependent/Total  Supine to Sit: 2 Person assistance (total assist of 2)  Sit to Supine: 2 Person assistance (total assist of 2)  Transfers  Sit to stand: Unable to assess  Stand to sit: Unable to assess  Transfer Comments: d/t safety     Cognition  Overall Cognitive Status: Exceptions  Arousal/Alertness: Delayed responses to stimuli;Inconsistent responses to stimuli  Following Commands: Inconsistently follows commands  Attention Span: Attends with cues to redirect; Difficulty attending to directions; Unable to maintain attention  Memory: Decreased recall of recent events;Decreased short term memory  Safety Judgement: Decreased awareness of need for safety  Problem Solving: Decreased awareness of errors  Insights: Not aware of deficits  Initiation: Requires cues for all  Sequencing: Requires cues for all  Perception  Overall Perceptual Status: Impaired  Unilateral Attention: Cues to maintain midline in sitting  Initiation: Cues to initiate tasks  Motor Planning: Cues to use objects appropriately     Sensation  Overall Sensation Status: WFL        LUE AROM (degrees)  LUE AROM :  (unable to fully assess, pt not following directions, difficulty with coordinating finger to nose, especially on L side, decreased coordination and increased time on R side)  LUE Strength  Gross LUE Strength:  (unable to assess, pt not following directions)                   Plan   Plan  Times per week: 5-7  Times per day: Daily  Plan weeks: cotx  Current Treatment Recommendations: Strengthening, Patient/Caregiver Education & Training, Balance Training, Neuromuscular Re-education, Functional Mobility Training, Cognitive Reorientation, Cognitive/Perceptual Training, Self-Care / ADL, Safety Education & Training      AM-PAC Score        AM-[facility] Inpatient Daily Activity Raw Score: 7 (06/03/21 1033)  AM-PAC Inpatient ADL T-Scale Score : 20.13 (06/03/21 1033)  ADL Inpatient CMS 0-100% Score: 92.44 (06/03/21 1033)  ADL Inpatient CMS G-Code Modifier : CM (06/03/21 1033)    Goals  Short term goals  Time Frame for Short term goals: discharge  Short term goal 1: bed mobility modA  Short term goal 2: functional ADL transfer maxA  Short term goal 3: sitting balance for ADL tasks Antonio  Short term goal 4: standing tolerance ~1-2 minutes with assist of 1  Short term goal 5: UB ADLs and grooming Antonio  Long term goals  Time Frame for Long term goals : STG=LTG  Long term goal 1: LB ADLs maxA       Therapy Time   Individual Concurrent Group Co-treatment   Time In 0817         Time Out 0901         Minutes 44           Timed Code Treatment Minutes:  29 Minutes    Total Treatment Minutes:  44 minutes      Lani Mcfadden OTR/L [id number]      Lani Mcfadden OT

## 2021-06-03 NOTE — PROGRESS NOTES
Hospital Medicine Progress Note     Date:  6/3/2021    PCP: No primary care provider on file. (Tel: None)    Date of Admission: 5/29/2021        Subjective  Patient is spoken to through video . Patient more alert today, still delayed with responses. Objective  Physical exam:  Vitals: BP (!) 160/84   Pulse 102   Temp 98.5 °F (36.9 °C) (Axillary)   Resp 20   Ht 5' 4\" (1.626 m)   Wt 121 lb 8 oz (55.1 kg)   SpO2 96%   BMI 20.86 kg/m²   Gen: drowsy, NAD  Head: Normocephalic. Atraumatic. Eyes: PERRLA  ENT: Oral mucosa moist  Neck: No JVD. No obvious thyromegaly. CVS: Nml S1S2, no MRG, RRR  Pulmomary: Clear bilaterally. No crackles. No wheezes. Gastrointestinal: Soft, NT/ND. Positive bowel sounds. Musculoskeletal: No edema. Warm  Neuro: Moving extremities spontaneously, alert and oriented to person  Psychiatry: Not agitated  Skin: Warm, dry with normal turgor. No rash      24HR INTAKE/OUTPUT:      Intake/Output Summary (Last 24 hours) at 6/3/2021 1121  Last data filed at 6/2/2021 1756  Gross per 24 hour   Intake 1232.67 ml   Output --   Net 1232.67 ml     I/O last 3 completed shifts: In: 1232.7 [I.V.:468; IV Piggyback:764.7]  Out: -   No intake/output data recorded.       Meds:    magnesium sulfate  2,000 mg Intravenous Once    ampicillin-sulbactam  1,500 mg Intravenous Q6H    magnesium oxide  400 mg Oral BID    lisinopril  10 mg Oral Daily    levothyroxine  50 mcg Oral QAM AC    insulin lispro  0-12 Units Subcutaneous V6K    folic acid  1 mg Oral Daily    thiamine  100 mg Intravenous Daily    gabapentin  100 mg Oral TID    FLUoxetine  20 mg Oral Daily    pravastatin  40 mg Oral Daily    amitriptyline  50 mg Oral Nightly    vitamin B-12  500 mcg Oral Daily    ferrous sulfate  325 mg Oral BID WC    sodium chloride flush  5-40 mL Intravenous 2 times per day    enoxaparin  40 mg Subcutaneous Daily       Infusions:    0.45 % NaCl with KCl 20 mEq 75 mL/hr at 06/03/21 0798  sodium chloride Stopped (06/02/21 0415)    dextrose           PRN Meds: potassium chloride **OR** potassium alternative oral replacement **OR** potassium chloride, cloNIDine, labetalol, sodium chloride flush, LORazepam **OR** LORazepam **OR** LORazepam **OR** LORazepam **OR** LORazepam **OR** LORazepam **OR** LORazepam **OR** LORazepam, sodium chloride flush, sodium chloride, promethazine **OR** ondansetron, polyethylene glycol, acetaminophen **OR** acetaminophen, glucose, dextrose, glucagon (rDNA), dextrose    Labs/imaging:  CBC:   Recent Labs     06/01/21 0434 06/02/21 0435 06/03/21 0414   WBC 9.7 6.7 6.9   HGB 11.0* 11.1* 11.1*    220 228         BMP:    Recent Labs     06/01/21  0434 06/02/21 0435 06/02/21  1737 06/03/21 0414   * 144  --  139   K 3.5 3.0* 3.8 3.8    103  --  101   CO2 28 27  --  20*   BUN 6* 6*  --  8   CREATININE <0.5* 0.5*  --  0.6*   GLUCOSE 173* 146*  --  118*         Hepatic:   Recent Labs     06/02/21 0435   AST 28   ALT 13   BILITOT 0.4   ALKPHOS 116       Troponin: No results for input(s): TROPONINI in the last 72 hours. BNP: No results for input(s): BNP in the last 72 hours. INR: No results for input(s): INR in the last 72 hours. Reviewed imaging and reports noted      Assessment:  Active Problems:    Hypoglycemia    New onset seizure ([facility] [address]    HTN (hypertension), benign    Transient alteration of awareness    Alcohol withdrawal syndrome with complication (HCC)    DM (diabetes mellitus), type 2, uncontrolled with complications (HCC)    Anxiety and depression    Diabetic peripheral neuropathy (HCC)    Hyperlipidemia associated with type 2 diabetes mellitus (HCC)    Acquired hypothyroidism    Bilateral pneumonia    Encephalopathy, metabolic    Alcohol abuse  Resolved Problems:    * No resolved hospital problems. *        Plan:   Altered mental status  -Unclear etiology  -Could be secondary to combination of hypoglycemia, suspected alcohol

## 2021-06-03 NOTE — PROGRESS NOTES
Comprehensive Nutrition Assessment    Type and Reason for Visit:  Initial (LOS assessment)    Nutrition Recommendations/Plan:   Offer Glucerna BID    Nutrition Assessment:  Pt is nutritionally compromised as evidenced by inadequate PO intake x 5 days during admission. Pt was admitted with altered mental status and was held NPO 6/1 and 6/2. When he has been on a diet, PO intake has been less than 50% of meals. Unable to assess weight loss as there is no hx to review in EMR. Note pt drinks daily; thiamine and folic acid are ordered. Will offer Glucerna BID given inadequate nutrition. Malnutrition Assessment:  Malnutrition Status:  Insufficient data      Estimated Daily Nutrient Needs:  Energy (kcal):  2948-7648; Weight Used for Energy Requirements:  Current (55 kg)     Protein (g):  66-83 grams; Weight Used for Protein Requirements:  Current (55 kg; 1.2-1.5 grams per kg)        Fluid (ml/day):   ; Method Used for Fluid Requirements:  1 ml/kcal      Nutrition Related Findings:  No edema noted. Oriented to place. Mg+ 1.7. +11.5 liters. Wounds:  None       Current Nutrition Therapies:    ADULT DIET; Dysphagia - Pureed; No Drinking Straws    Anthropometric Measures:  · Height: 5' 4\" (162.6 cm)  · Current Body Weight: 121 lb 7.6 oz (55.1 kg)   · Admission Body Weight: 121 lb 7.6 oz (55.1 kg)    · Ideal Body Weight: 130 lbs; % Ideal Body Weight 93.4 %   · BMI: 20.8  · BMI Categories: Normal Weight (BMI 18.5-24. 9)       Nutrition Diagnosis:   · Inadequate oral intake related to inadequate enteral nutrition infusion as evidenced by intake 26-50%, intake 0-25%      Nutrition Interventions:   Food and/or Nutrient Delivery:  Continue Current Diet, Start Oral Nutrition Supplement  Nutrition Education/Counseling:  Education not indicated   Coordination of Nutrition Care:  Continue to monitor while inpatient    Goals:  Pt will consume at least 50% of meals and supplements       Nutrition Monitoring and Evaluation: Behavioral-Environmental Outcomes:  None Identified   Food/Nutrient Intake Outcomes:  Food and Nutrient Intake, Supplement Intake  Physical Signs/Symptoms Outcomes:  Biochemical Data     Discharge Planning:     Too soon to determine     Electronically signed by Neil Leon RD, NORMA on 6/3/21 at 1:31 PM EDT    Contact: [phone]

## 2021-06-04 ENCOUNTER — APPOINTMENT (OUTPATIENT)
Dept: MRI IMAGING | Age: 51
DRG: 420 | End: 2021-06-04
Payer: MEDICAID

## 2021-06-04 LAB
ANION GAP SERPL CALCULATED.3IONS-SCNC: 9 MMOL/L (ref 3–16)
BASOPHILS ABSOLUTE: 0 K/UL (ref 0–0.2)
BASOPHILS RELATIVE PERCENT: 0.6 %
BUN BLDV-MCNC: 7 MG/DL (ref 7–20)
CALCIUM SERPL-MCNC: 8.6 MG/DL (ref 8.3–10.6)
CHLORIDE BLD-SCNC: 104 MMOL/L (ref 99–110)
CO2: 25 MMOL/L (ref 21–32)
CREAT SERPL-MCNC: 0.6 MG/DL (ref 0.9–1.3)
EOSINOPHILS ABSOLUTE: 0.2 K/UL (ref 0–0.6)
EOSINOPHILS RELATIVE PERCENT: 3.1 %
GFR AFRICAN AMERICAN: >60
GFR NON-AFRICAN AMERICAN: >60
GLUCOSE BLD-MCNC: 117 MG/DL (ref 70–99)
GLUCOSE BLD-MCNC: 176 MG/DL (ref 70–99)
GLUCOSE BLD-MCNC: 187 MG/DL (ref 70–99)
GLUCOSE BLD-MCNC: 215 MG/DL (ref 70–99)
GLUCOSE BLD-MCNC: 268 MG/DL (ref 70–99)
GLUCOSE BLD-MCNC: 274 MG/DL (ref 70–99)
HCT VFR BLD CALC: 34.2 % (ref 40.5–52.5)
HEMOGLOBIN: 11.6 G/DL (ref 13.5–17.5)
LYMPHOCYTES ABSOLUTE: 1.5 K/UL (ref 1–5.1)
LYMPHOCYTES RELATIVE PERCENT: 23.9 %
MAGNESIUM: 1.8 MG/DL (ref 1.8–2.4)
MCH RBC QN AUTO: 33.4 PG (ref 26–34)
MCHC RBC AUTO-ENTMCNC: 33.8 G/DL (ref 31–36)
MCV RBC AUTO: 98.8 FL (ref 80–100)
MONOCYTES ABSOLUTE: 1 K/UL (ref 0–1.3)
MONOCYTES RELATIVE PERCENT: 15.6 %
NEUTROPHILS ABSOLUTE: 3.6 K/UL (ref 1.7–7.7)
NEUTROPHILS RELATIVE PERCENT: 56.8 %
PDW BLD-RTO: 12.4 % (ref 12.4–15.4)
PERFORMED ON: ABNORMAL
PLATELET # BLD: 237 K/UL (ref 135–450)
PMV BLD AUTO: 7.4 FL (ref 5–10.5)
POTASSIUM REFLEX MAGNESIUM: 3.9 MMOL/L (ref 3.5–5.1)
RBC # BLD: 3.46 M/UL (ref 4.2–5.9)
SODIUM BLD-SCNC: 138 MMOL/L (ref 136–145)
WBC # BLD: 6.4 K/UL (ref 4–11)

## 2021-06-04 PROCEDURE — 97530 THERAPEUTIC ACTIVITIES: CPT

## 2021-06-04 PROCEDURE — 2500000003 HC RX 250 WO HCPCS: Performed by: INTERNAL MEDICINE

## 2021-06-04 PROCEDURE — 6360000002 HC RX W HCPCS: Performed by: INTERNAL MEDICINE

## 2021-06-04 PROCEDURE — 6370000000 HC RX 637 (ALT 250 FOR IP): Performed by: INTERNAL MEDICINE

## 2021-06-04 PROCEDURE — 97129 THER IVNTJ 1ST 15 MIN: CPT

## 2021-06-04 PROCEDURE — 70551 MRI BRAIN STEM W/O DYE: CPT

## 2021-06-04 PROCEDURE — 6360000002 HC RX W HCPCS: Performed by: FAMILY MEDICINE

## 2021-06-04 PROCEDURE — 97535 SELF CARE MNGMENT TRAINING: CPT

## 2021-06-04 PROCEDURE — 36415 COLL VENOUS BLD VENIPUNCTURE: CPT

## 2021-06-04 PROCEDURE — 92507 TX SP LANG VOICE COMM INDIV: CPT

## 2021-06-04 PROCEDURE — 2060000000 HC ICU INTERMEDIATE R&B

## 2021-06-04 PROCEDURE — 99232 SBSQ HOSP IP/OBS MODERATE 35: CPT | Performed by: PSYCHIATRY & NEUROLOGY

## 2021-06-04 PROCEDURE — 2580000003 HC RX 258: Performed by: FAMILY MEDICINE

## 2021-06-04 PROCEDURE — 85025 COMPLETE CBC W/AUTO DIFF WBC: CPT

## 2021-06-04 PROCEDURE — 92526 ORAL FUNCTION THERAPY: CPT

## 2021-06-04 PROCEDURE — 6370000000 HC RX 637 (ALT 250 FOR IP): Performed by: FAMILY MEDICINE

## 2021-06-04 PROCEDURE — 2580000003 HC RX 258: Performed by: STUDENT IN AN ORGANIZED HEALTH CARE EDUCATION/TRAINING PROGRAM

## 2021-06-04 PROCEDURE — 97116 GAIT TRAINING THERAPY: CPT

## 2021-06-04 PROCEDURE — 83735 ASSAY OF MAGNESIUM: CPT

## 2021-06-04 PROCEDURE — 2580000003 HC RX 258: Performed by: INTERNAL MEDICINE

## 2021-06-04 PROCEDURE — 80048 BASIC METABOLIC PNL TOTAL CA: CPT

## 2021-06-04 RX ORDER — INSULIN LISPRO 100 [IU]/ML
0-12 INJECTION, SOLUTION INTRAVENOUS; SUBCUTANEOUS
Status: DISCONTINUED | OUTPATIENT
Start: 2021-06-04 | End: 2021-06-07 | Stop reason: HOSPADM

## 2021-06-04 RX ORDER — AMOXICILLIN AND CLAVULANATE POTASSIUM 875; 125 MG/1; MG/1
1 TABLET, FILM COATED ORAL EVERY 12 HOURS SCHEDULED
Status: COMPLETED | OUTPATIENT
Start: 2021-06-04 | End: 2021-06-05

## 2021-06-04 RX ORDER — INSULIN LISPRO 100 [IU]/ML
0-6 INJECTION, SOLUTION INTRAVENOUS; SUBCUTANEOUS NIGHTLY
Status: DISCONTINUED | OUTPATIENT
Start: 2021-06-04 | End: 2021-06-07 | Stop reason: HOSPADM

## 2021-06-04 RX ORDER — LACTOBACILLUS RHAMNOSUS GG 10B CELL
1 CAPSULE ORAL 2 TIMES DAILY WITH MEALS
Status: DISCONTINUED | OUTPATIENT
Start: 2021-06-04 | End: 2021-06-07 | Stop reason: HOSPADM

## 2021-06-04 RX ADMIN — SODIUM CHLORIDE 1500 MG: 900 INJECTION INTRAVENOUS at 12:50

## 2021-06-04 RX ADMIN — LISINOPRIL 10 MG: 10 TABLET ORAL at 10:45

## 2021-06-04 RX ADMIN — THIAMINE HYDROCHLORIDE 100 MG: 100 INJECTION, SOLUTION INTRAMUSCULAR; INTRAVENOUS at 10:45

## 2021-06-04 RX ADMIN — SODIUM CHLORIDE 25 ML: 9 INJECTION, SOLUTION INTRAVENOUS at 12:48

## 2021-06-04 RX ADMIN — Medication 10 ML: at 10:45

## 2021-06-04 RX ADMIN — ENOXAPARIN SODIUM 40 MG: 40 INJECTION SUBCUTANEOUS at 10:45

## 2021-06-04 RX ADMIN — INSULIN LISPRO 1 UNITS: 100 INJECTION, SOLUTION INTRAVENOUS; SUBCUTANEOUS at 21:32

## 2021-06-04 RX ADMIN — MAGNESIUM GLUCONATE 500 MG ORAL TABLET 400 MG: 500 TABLET ORAL at 21:26

## 2021-06-04 RX ADMIN — Medication 10 ML: at 06:31

## 2021-06-04 RX ADMIN — GABAPENTIN 100 MG: 100 CAPSULE ORAL at 10:45

## 2021-06-04 RX ADMIN — FOLIC ACID 1 MG: 1 TABLET ORAL at 10:45

## 2021-06-04 RX ADMIN — Medication 10 ML: at 12:45

## 2021-06-04 RX ADMIN — FLUOXETINE 20 MG: 20 CAPSULE ORAL at 10:45

## 2021-06-04 RX ADMIN — Medication 10 ML: at 01:18

## 2021-06-04 RX ADMIN — Medication 1 CAPSULE: at 17:30

## 2021-06-04 RX ADMIN — AMITRIPTYLINE HYDROCHLORIDE 50 MG: 50 TABLET, FILM COATED ORAL at 21:26

## 2021-06-04 RX ADMIN — LABETALOL HYDROCHLORIDE 10 MG: 5 INJECTION INTRAVENOUS at 17:35

## 2021-06-04 RX ADMIN — INSULIN LISPRO 6 UNITS: 100 INJECTION, SOLUTION INTRAVENOUS; SUBCUTANEOUS at 12:45

## 2021-06-04 RX ADMIN — SODIUM CHLORIDE 1500 MG: 900 INJECTION INTRAVENOUS at 01:19

## 2021-06-04 RX ADMIN — PRAVASTATIN SODIUM 40 MG: 40 TABLET ORAL at 10:45

## 2021-06-04 RX ADMIN — FERROUS SULFATE TAB 325 MG (65 MG ELEMENTAL FE) 325 MG: 325 (65 FE) TAB at 17:30

## 2021-06-04 RX ADMIN — SODIUM CHLORIDE 1500 MG: 900 INJECTION INTRAVENOUS at 06:31

## 2021-06-04 RX ADMIN — GABAPENTIN 100 MG: 100 CAPSULE ORAL at 21:26

## 2021-06-04 RX ADMIN — LABETALOL HYDROCHLORIDE 10 MG: 5 INJECTION INTRAVENOUS at 05:05

## 2021-06-04 RX ADMIN — MAGNESIUM GLUCONATE 500 MG ORAL TABLET 400 MG: 500 TABLET ORAL at 10:45

## 2021-06-04 RX ADMIN — CLONIDINE HYDROCHLORIDE 0.2 MG: 0.1 TABLET ORAL at 21:26

## 2021-06-04 RX ADMIN — GABAPENTIN 100 MG: 100 CAPSULE ORAL at 14:45

## 2021-06-04 RX ADMIN — LEVOTHYROXINE SODIUM 50 MCG: 0.03 TABLET ORAL at 06:52

## 2021-06-04 RX ADMIN — POTASSIUM CHLORIDE AND SODIUM CHLORIDE: 450; 150 INJECTION, SOLUTION INTRAVENOUS at 15:04

## 2021-06-04 RX ADMIN — INSULIN LISPRO 4 UNITS: 100 INJECTION, SOLUTION INTRAVENOUS; SUBCUTANEOUS at 06:52

## 2021-06-04 RX ADMIN — FERROUS SULFATE TAB 325 MG (65 MG ELEMENTAL FE) 325 MG: 325 (65 FE) TAB at 10:45

## 2021-06-04 RX ADMIN — AMOXICILLIN AND CLAVULANATE POTASSIUM 1 TABLET: 875; 125 TABLET, FILM COATED ORAL at 21:26

## 2021-06-04 RX ADMIN — CYANOCOBALAMIN TAB 1000 MCG 500 MCG: 1000 TAB at 10:45

## 2021-06-04 RX ADMIN — Medication 10 ML: at 21:26

## 2021-06-04 ASSESSMENT — PAIN SCALES - GENERAL
PAINLEVEL_OUTOF10: 0

## 2021-06-04 NOTE — PROGRESS NOTES
Call placed back to [facility] ([facility]) to update as she requested in prior conversation. She was updated that pt would not eat for this RN or PCA along with minimal assessment able to be completed d/t pt not cooperative with interpretation services via Video chat. She explained that pt can speak \"perfectly good\" english and that there is no need for interpretation services and that she will come up to hospital shortly to assist pt with eating.

## 2021-06-04 NOTE — PROGRESS NOTES
Occupational Therapy  Facility/Department: [address]  Daily Treatment Note  NAME: Regina Knight  : 1970  MRN: [id number]    Date of Service: 2021    Discharge Recommendations:    Regina Knight scored a  on the AM-PAC ADL Inpatient form. Current research shows that an AM-PAC score of 17 or less is typically not associated with a discharge to the patient's home setting. Based on the patient's AM-PAC score and their current ADL deficits, it is recommended that the patient have 5-7 sessions per week of Occupational Therapy at d/c to increase the patient's independence. At this time, this patient demonstrates the endurance, and/or tolerance for 3 hours of therapy each day, with a treatment frequency of 5-7x/wk. Please see assessment section for further patient specific details. If patient discharges prior to next session this note will serve as a discharge summary. Please see below for the latest assessment towards goals. OT Equipment Recommendations  Equipment Needed: No, continue to assess as pt progresses     Assessment   Performance deficits / Impairments: Decreased functional mobility ; Decreased ADL status; Decreased balance;Decreased strength;Decreased high-level IADLs;Decreased safe awareness;Decreased cognition;Decreased coordination;Decreased posture  Assessment: Pt requires frequent verbal/tactile cueing to initiate all ADLs/functional transfers. Pt not at baseline level of functioning, difficulty with assessing pt d/t language barrier and decreased cognition. lethargic upon arrival, becoming more alert with EOB sitting.  pt not at baseline and would benefit from ongoing skilled OT services in order to return to PLOF  Treatment Diagnosis: MRI pending, hypoglycemia  Prognosis: Good;Fair  OT Education: OT Role;Orientation;Transfer Training  Patient Education: importance of OOB activity, orientation-pt is not an independent learner, will benefit from continued reinforcement for carryover   REQUIRES OT FOLLOW UP: Yes  Activity Tolerance  Activity Tolerance: Patient Tolerated treatment well;Treatment limited secondary to decreased cognition  Safety Devices  Safety Devices in place: Yes  Type of devices: All fall risk precautions in place;Gait belt;Patient at risk for falls;Call light within reach; Chair alarm in place;Nurse notified (RN in room at [address] [place] [address])  Restraints  Initially in place: Yes  Restraints: telesitter present, seizure precautions in place, all 4 bedrails up-nursing aide/sitter present         Patient Diagnosis(es): The primary encounter diagnosis was Hypoglycemia. Diagnoses of Alcohol abuse and Hypoglycemia secondary to sulfonylurea, accidental or unintentional, sequela were also pertinent to this visit. has a past medical history of Diabetes mellitus ([facility] [address] has no past surgical history on file. Restrictions  Restrictions/Precautions  Restrictions/Precautions: Fall Risk, Seizure (Simultaneous filing. User may not have seen previous data.)  Position Activity Restriction  Other position/activity restrictions: The patient is a 45y.o.  years old male with history of diabetes who was admitted to the hospital few days ago with acute encephalopathy. He was found unresponsive and unconscious in his own apartment by his friend. Unclear duration but according to report the patient was drinking few beers on the day of the incident. His friend went outside and came back and found him unresponsive. Degree was severe. Other associated symptoms included low blood sugar level when he arrived to the ED. No witnessed seizure-like activity, tongue biting or bladder incontinence. No recent fever chills or head trauma. Degree was severe. Unclear duration. No other relieving or aggravating factors. Initial work-up in the ED with metabolic blood testing was unremarkable except for anemia and fluctuating blood sugar level. He was admitted. Over the last few days, he remained confused and agitated. He received DT precaution with Ativan and Librium. Now is waxing and waning. Opens eyes to voice and goes back to sleep. There has been some conflicting report from friends about his drinking habits. His UDS on admission was negative. Blood test today was unremarkable except for his anemia. He had his EEG yesterday which showed no epileptiform discharges. Other review of system was limited (Simultaneous filing. User may not have seen previous data.)  Subjective   General  Chart Reviewed: Yes  Patient assessed for rehabilitation services?: Yes  Additional Pertinent Hx: Mal[place]ian speaking, RN present to assist with translation  Family / Caregiver Present: No  Diagnosis: hypoglycemia  Subjective  Subjective: Pt supine in bed upon arrival, understood English well enough to agree to OT/PT/ST therapy cotx. Pt oriented x1 and denies pain. Therapist utilized RN for translation during session on this date. Orientation  Orientation  Overall Orientation Status: Impaired  Orientation Level: Oriented to person  Objective    ADL  Feeding: Moderate assistance; Increased time to complete (minced and moist diet update during this session per ST)  Grooming: Setup; Moderate assistance (Oral care, tactile cueing required for thoroughness)  UE Bathing: Maximum assistance;Setup (seated in room chair, tactile cueing and [place] required for initiation)  Additional Comments: Pt seated in room chair for ADLs, ST completed swallow evaluation at this time, updated diet to minced/moist. Pt requires tactile cueing for initiation/understanding to verbal commands        Balance  Sitting Balance: Minimal assistance (Static sit EOB alternating Mo-CGA, anterior lean observed)  Standing Balance: Maximum assistance  Standing Balance  Time: ~2 minutes total  Activity: functional stand step transfer EOB > room chair  Functional Mobility  Functional - Mobility Device: Rolling Walker  Activity: Other (EOB > room chair)  Assist Level: Maximum assistance  Functional Mobility Comments: Pt required MaxAx2 w/assist from another for step pattern w/use of RW. Pt demonstrates ability to motor plan foot placement last 3 steps to chair. Vcs for hand placement Stand > Sit. Bed mobility  Supine to Sit: Minimal assistance; Moderate assistance;2 Person assistance (Minx1, Modx1)  Scooting: Maximal assistance (MaxAx1 to EOB, MaxAx2 to back of room chair)  Transfers  Stand Step Transfers: Maximum assistance  Sit to stand: Maximum assistance (MaxAx2 w/A from another to block knees in stance)  Stand to sit: Maximum assistance (MaxAx2 with decreased eccentric control)  Transfer Comments: d/t B LE weakness/safety            Cognition  Overall Cognitive Status: Exceptions  Arousal/Alertness: Delayed responses to stimuli;Inconsistent responses to stimuli  Following Commands: Inconsistently follows commands; Follows one step commands with increased time  Attention Span: Attends with cues to redirect; Difficulty attending to directions; Unable to maintain attention; Difficulty dividing attention  Memory: Decreased recall of recent events;Decreased short term memory  Safety Judgement: Decreased awareness of need for assistance;Decreased awareness of need for safety  Problem Solving: Decreased awareness of errors;Assistance required to identify errors made;Assistance required to generate solutions  Insights: Not aware of deficits  Initiation: Requires cues for all  Sequencing: Requires cues for all  Cognition Comment: Pt demonstrates ability to understand English and will speak in 1-2 word responses.  Able to respond in longer sentences in [place]er with bilingual RN     Perception  Overall Perceptual Status: Impaired  Unilateral Attention: Cues to maintain midline in sitting;Cues to maintain midline in standing  Initiation: Cues to initiate tasks (Tactile cueing for initiation)  Motor Planning: Hand over hand to sequence tasks (Hand over foot to sequence step pattern)            Plan   Plan  Times per week: 5-7  Times per day: Daily  Plan weeks: cotx  Current Treatment Recommendations: Strengthening, Patient/Caregiver Education & Training, Balance Training, Neuromuscular Re-education, Functional Mobility Training, Cognitive Reorientation, Cognitive/Perceptual Training, Self-Care / ADL, Safety Education & Training     AM-[facility] Inpatient Daily Activity Raw Score: 11 (06/04/21 1152)  AM-PAC Inpatient ADL T-Scale Score : 29.04 (06/04/21 1152)  ADL Inpatient CMS 0-100% Score: 70.42 (06/04/21 1152)  ADL Inpatient CMS G-Code Modifier : CL (06/04/21 1152)    Goals  Short term goals  Time Frame for Short term goals: discharge  Short term goal 1: bed mobility modA-GOAL MET 6/4  Short term goal 2: functional ADL transfer maxA-MaxAx2 6/04  Short term goal 3: sitting balance for ADL tasks Antonio-ongoing 6/04  Short term goal 4: standing tolerance ~1-2 minutes with assist of 1-Ax2 6/04  Short term goal 5: UB ADLs and grooming Antonio-ModA 6/04  Long term goals  Time Frame for Long term goals : STG=LTG  Long term goal 1: LB ADLs maxA       Therapy Time   Individual Concurrent Group Co-treatment   Time In       1001   Time Out       1040   Minutes       39     Timed Code Treatment Minutes:  39 Minutes  Total Treatment Minutes:  39 minutes    SHAREE Hussein    [facility], [place]ICIO/[id number]    TEDDY provided direct supervision to student, facilitated in making skilled judgements throughout duration of session.

## 2021-06-04 NOTE — PROGRESS NOTES
Hospital Medicine Progress Note     Date:  6/4/2021    PCP: No primary care provider on file. (Tel: None)    Date of Admission: 5/29/2021        Subjective  Pt looks better today, denies any acute complaints, suspect this is his baseline. Objective  Physical exam:  Vitals: /85   Pulse 102   Temp 97.6 °F (36.4 °C) (Oral)   Resp 16   Ht 5' 4\" (1.626 m)   Wt 121 lb 1.6 oz (54.9 kg)   SpO2 95%   BMI 20.79 kg/m²   Gen: drowsy, NAD  Head: Normocephalic. Atraumatic. Eyes: PERRLA  ENT: Oral mucosa moist  Neck: No JVD. No obvious thyromegaly. CVS: Nml S1S2, no MRG, RRR  Pulmomary: Clear bilaterally. No crackles. No wheezes. Gastrointestinal: Soft, NT/ND. Positive bowel sounds. Musculoskeletal: No edema. Warm  Neuro: Moving extremities spontaneously, alert and oriented to person  Psychiatry: Not agitated  Skin: Warm, dry with normal turgor. No rash      24HR INTAKE/OUTPUT:      Intake/Output Summary (Last 24 hours) at 6/4/2021 1412  Last data filed at 6/4/2021 0039  Gross per 24 hour   Intake 1067.06 ml   Output --   Net 1067.06 ml     I/O last 3 completed shifts: In: 1187.1 [P.O.:360; I.V.:827.1]  Out: -   No intake/output data recorded.       Meds:    insulin lispro  0-12 Units Subcutaneous TID WC    insulin lispro  0-6 Units Subcutaneous Nightly    ampicillin-sulbactam  1,500 mg Intravenous Q6H    magnesium oxide  400 mg Oral BID    lisinopril  10 mg Oral Daily    levothyroxine  50 mcg Oral QAM AC    folic acid  1 mg Oral Daily    thiamine  100 mg Intravenous Daily    gabapentin  100 mg Oral TID    FLUoxetine  20 mg Oral Daily    pravastatin  40 mg Oral Daily    amitriptyline  50 mg Oral Nightly    vitamin B-12  500 mcg Oral Daily    ferrous sulfate  325 mg Oral BID WC    sodium chloride flush  5-40 mL Intravenous 2 times per day    enoxaparin  40 mg Subcutaneous Daily       Infusions:    0.45 % NaCl with KCl 20 mEq 75 mL/hr at 06/04/21 1329    sodium chloride 25 mL (06/04/21 1248)    dextrose           PRN Meds: potassium chloride **OR** potassium alternative oral replacement **OR** potassium chloride, cloNIDine, labetalol, sodium chloride flush, LORazepam **OR** LORazepam **OR** LORazepam **OR** LORazepam **OR** LORazepam **OR** LORazepam **OR** LORazepam **OR** LORazepam, sodium chloride flush, sodium chloride, promethazine **OR** ondansetron, polyethylene glycol, acetaminophen **OR** acetaminophen, glucose, dextrose, glucagon (rDNA), dextrose    Labs/imaging:  CBC:   Recent Labs     06/02/21  0435 06/03/21  0414 06/04/21  0412   WBC 6.7 6.9 6.4   HGB 11.1* 11.1* 11.6*    228 237         BMP:    Recent Labs     06/02/21  0435 06/02/21  1737 06/03/21 0414 06/04/21  0412     --  139 138   K 3.0* 3.8 3.8 3.9     --  101 104   CO2 27  --  20* 25   BUN 6*  --  8 7   CREATININE 0.5*  --  0.6* 0.6*   GLUCOSE 146*  --  118* 268*         Hepatic:   Recent Labs     06/02/21 0435   AST 28   ALT 13   BILITOT 0.4   ALKPHOS 116       Troponin: No results for input(s): TROPONINI in the last 72 hours. BNP: No results for input(s): BNP in the last 72 hours. INR: No results for input(s): INR in the last 72 hours. Reviewed imaging and reports noted      Assessment:  Active Problems:    Hypoglycemia    New onset seizure ([facility] [address]    HTN (hypertension), benign    Transient alteration of awareness    Alcohol withdrawal syndrome with complication (HCC)    DM (diabetes mellitus), type 2, uncontrolled with complications (HCC)    Anxiety and depression    Diabetic peripheral neuropathy (HCC)    Hyperlipidemia associated with type 2 diabetes mellitus (HCC)    Acquired hypothyroidism    Bilateral pneumonia    Encephalopathy, metabolic    Alcohol abuse    Delirium  Resolved Problems:    * No resolved hospital problems. *        Plan:   Altered mental status  -Unclear etiology, improving  -Could be secondary to combination of hypoglycemia, suspected alcohol withdrawal and pneumonia  -Appreciate neurology recommendations  -CT head negative for anything acute  -MRI brain pending  - appreciate Psychiatry reccs      Suspected alcohol withdrawal  -DC Librium, resolved  -Continue as needed Ativan per CIWA only if absolutely necessary  -Continue vitamin B1 and folic acid and supportive care      Bilateral pneumonia  -transition Unasyn to augmentin, suspect aspiration  -Strep and Legionella negative  -Respiratory panel negative        Hypoglycemia present on admission  -Resolved, continue to monitor        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  -A1c 6.5  -Blood sugar stable, continue signs of insulin moderate, continue to monitor      Diabetic peripheral neuropathy  -Continue gabapentin      Hypertension associated with diabetes  - BP improved  -Continue lisinopril 10 mg daily,as needed clonidine and IV labetalol, continue to monitor        Hyperlipidemia associated with diabetes  -Continue statin        Acquired hypothyroidism  -cont Synthroid 50 MCG daily  -Follow-up with PCP and repeat TSH with reflex in 4 to 6 weeks      Anxiety and depression  -Cont Prozac and amitriptyline      Dispo: Discharge is dependent on further work-up, appears patient will likely need skilled nursing facility vs ARU. Diet: Adult Oral Nutrition Supplement; Diabetic Oral Supplement  ADULT DIET;  Dysphagia - Minced and Moist; 4 carb choices (60 gm/meal)    Activity: up with assist  Prophylaxis: lovenox    Code status: Full Code     ----------        Renée Miller MD  -------------------------------  Mariann hospitalist

## 2021-06-04 NOTE — PLAN OF CARE
Problem: Pain:  Goal: Pain level will decrease  Description: Pain level will decrease  Outcome: Ongoing  Note: Patient denies any pain at this time. Will continue to monitor. Problem: Falls - Risk of:  Goal: Will remain free from falls  Description: Will remain free from falls  Outcome: Ongoing  Note: Patient remains absent from falls at this time. Remains alert and oriented to person, up to chair with call light and belongings in reach. Non-slip footwear on and chair alarm activated. Fall precautions in place. Camera in room. Sitter at bedside. Will continue to monitor.

## 2021-06-04 NOTE — PROGRESS NOTES
Physical Therapy  Facility/Department: [address]  Daily Treatment Note  NAME: Alethea Mccord  : 1970  MRN: [id number]    Date of Service: 2021    Discharge Recommendations:    Alethea Mccord scored a 10/24 on the AM-PAC short mobility form. Current research shows that an AM-PAC score of 17 or less is typically not associated with a discharge to the patient's home setting. Based on the patient's AM-PAC score and their current functional mobility deficits, it is recommended that the patient have 5-7 sessions per week of Physical Therapy at d/c to increase the patient's independence. At this time, this patient demonstrates the endurance, and/or tolerance for 3 hours of therapy each day, with a treatment frequency of 5-7x/wk. Please see assessment section for further patient specific details. If patient discharges prior to next session this note will serve as a discharge summary. Please see below for the latest assessment towards goals. PT Equipment Recommendations  Equipment Needed: No (Defer to next level of care.)    Assessment   Body structures, Functions, Activity limitations: Decreased functional mobility ; Decreased cognition;Decreased ADL status; Decreased strength;Decreased safe awareness;Decreased balance;Decreased coordination;Decreased fine motor control  Assessment: on this date, pt was able to complete bed mobility with a 2 person assist and transfer from bed>chair with a 2 person dependant with a third person helping advance lower extremities for the first 3 steps. Pt reports to be independent at baseline, thought pt appears to be a poor historian and this needs to be confirmed. At this time, the patient requires high assist levels for all mobility and is unsafe to return home. pt would benefit from continued skilled PT to return to baseline functioning.   Treatment Diagnosis: Impaired functional mobility, impaired motor control  Prognosis: Good  PT Education: Goals;PT Role;Plan of Care;Transfer Training;General Safety  Patient Education: Pt verbalized understanding but would benefit from repetition  Barriers to Learning: Cognition, language (Indonesian)  REQUIRES PT FOLLOW UP: Yes  Activity Tolerance  Activity Tolerance: Patient limited by fatigue;Patient limited by cognitive status  Activity Tolerance: pt alert and easily distractable. Patient Diagnosis(es): The primary encounter diagnosis was Hypoglycemia. Diagnoses of Alcohol abuse and Hypoglycemia secondary to sulfonylurea, accidental or unintentional, sequela were also pertinent to this visit. has a past medical history of Diabetes mellitus ([address] has no past surgical history on file. Restrictions  Restrictions/Precautions  Restrictions/Precautions: Fall Risk, Seizure (Simultaneous filing. User may not have seen previous data.)  Position Activity Restriction  Other position/activity restrictions: The patient is a 45y.o.  years old male with history of diabetes who was admitted to the hospital few days ago with acute encephalopathy. He was found unresponsive and unconscious in his own apartment by his friend. Unclear duration but according to report the patient was drinking few beers on the day of the incident. His friend went outside and came back and found him unresponsive. Degree was severe. Other associated symptoms included low blood sugar level when he arrived to the ED. No witnessed seizure-like activity, tongue biting or bladder incontinence. No recent fever chills or head trauma. Degree was severe. Unclear duration. No other relieving or aggravating factors. Initial work-up in the ED with metabolic blood testing was unremarkable except for anemia and fluctuating blood sugar level. He was admitted. Over the last few days, he remained confused and agitated. He received DT precaution with Ativan and Librium. Now is waxing and waning.   Opens eyes to voice and goes back to begin, but knee control improved leading to max x2~5 min)  Stand Pivot Transfers: 2 Person Assistance;Maximum Assistance  Ambulation  Ambulation?: Yes  Ambulation 1  Surface: level tile  Device: Rolling Walker  Assistance: 2 Person assistance;Maximum assistance (max x2 with an additional person advancing feet to begin. ended as max x2 as pt gained LE function. ~5 min)  Quality of Gait: forward head and trunk; decreased motion in LEs. Gait Deviations: Slow Katie;Decreased step length;Decreased step height;Decreased arm swing  Distance: 5'     Balance  Posture: Poor (forward head and trunk. pt able to lift head to sound but unable to maintain upright posture.)  Sitting - Static: Fair (pt sat EOB with Antonio>SBA. ~10 min)  Sitting - Dynamic: Fair (pt performed ADLs seated in recliner with Supervision and leaning against back rest. ~15 min)  Standing - Static: Poor (max A x2 +1 for knee control and RW to stand. )       AM-PAC Score  -PAC Inpatient Mobility Raw Score : 10 (06/04/21 1141)  AM-PAC Inpatient T-Scale Score : 32.29 (06/04/21 1141)  Mobility Inpatient CMS 0-100% Score: 76.75 (06/04/21 1141)  Mobility Inpatient CMS G-Code Modifier : CL (06/04/21 1141)          Goals  Short term goals  Time Frame for Short term goals: Before discharge  Short term goal 1: Pt will complete bed mobility with Mod A  Short term goal 2: Pt will sit EOB x10 minutes with no more than Min A (met 6/4)  Short term goal 3: Pt will complete sit<>stand with Mod A of 2 persons  Short term goal 4: Pt will transfer bed<>chair with Mod A of 2 persons and LRAD  Patient Goals   Patient goals : None stated  Pt met one goal on this date.      Plan    Plan  Times per week: 5-7x  Current Treatment Recommendations: Strengthening, ROM, Balance Training, Transfer Training, Endurance Training, ADL/Self-care Training, Functional Mobility Training, Gait Training, Cognitive/Perceptual Training, Neuromuscular Re-education, Safety Education & Training, Cognitive Reorientation  Safety Devices  Type of devices: All fall risk precautions in place, Left in chair, Nurse notified, Rene Wolfe in use, Chair alarm in place  Restraints  Initially in place: Yes  Restraints: All seizure precautions in place with bedrails up x4, seizure pads on, and telesitter in room. Nuse stated that we could leave pt in chair at the end of the session. Therapy Time   Individual Concurrent Group Co-treatment   Time In       1001   Time Out       1040   Minutes       39   Timed Code Treatment Minutes: 744 [facility], [facility]  PT observed and directed the above evaluation/treatment.   [address], DPT [id number]

## 2021-06-04 NOTE — PROGRESS NOTES
Dr. Rocky Rivera notified via [employer] of /119 and then 177/96 after PRN labetalol. No new orders received at this time.

## 2021-06-04 NOTE — PROGRESS NOTES
Mally Alfredo  Neurology Follow-up  [facility]    Date of Service: 6/4/2021    Subjective:   CC: Follow up today regarding: Acute encephalopathy    Events noted. Chart and lab reviewed. The patient is about the same. He is awake and alert but not fluent directions. No focal weakness. MRI is pending. He denies any headaches, weakness or numbness. Other ROS was negative. ROS : A 10-12 system review obtained and updated today and is unremarkable except as mentioned  in my interval history.      Family history: Noncontributory    Past Medical History:   Diagnosis Date    Diabetes mellitus ([address]      Current Facility-Administered Medications   Medication Dose Route Frequency Provider Last Rate Last Admin    insulin lispro (1 Unit Dial) 0-12 Units  0-12 Units Subcutaneous TID WC Arik Charlton MD        insulin lispro (1 Unit Dial) 0-6 Units  0-6 Units Subcutaneous Nightly Arik Charlton MD        ampicillin-sulbactam (UNASYN) 1500 mg IVPB minibag  1,500 mg Intravenous Q6H Arik Charlton MD   Stopped at 06/04/21 0705    magnesium oxide (MAG-OX) tablet 400 mg  400 mg Oral BID Arik Charlton MD   400 mg at 06/04/21 1045    potassium chloride (KLOR-CON M) extended release tablet 40 mEq  40 mEq Oral PRN Arik Charlton MD        Or    potassium bicarb-citric acid (EFFER-K) effervescent tablet 40 mEq  40 mEq Oral PRN Arik Charlton MD        Or    potassium chloride 10 mEq/100 mL IVPB (Peripheral Line)  10 mEq Intravenous PRN Arik Charlton  mL/hr at 06/02/21 1550 10 mEq at 06/02/21 1550    lisinopril (PRINIVIL;ZESTRIL) tablet 10 mg  10 mg Oral Daily Arik Charlton MD   10 mg at 06/04/21 1045    0.45 % NaCl with KCl 20 mEq infusion   Intravenous Continuous Arik Charlton MD 75 mL/hr at 06/04/21 0220 Restarted at 06/04/21 0220    levothyroxine (SYNTHROID) tablet 50 mcg  50 mcg Oral QAM AC Arik Charlton MD   50 mcg at 46/92/10 1649    folic acid (FOLVITE) tablet 1 mg  1 mg Oral Daily Rolanda Magaña MD   1 mg at 06/04/21 1045    thiamine (B-1) injection 100 mg  100 mg Intravenous Daily Rolanda Magaña MD   100 mg at 06/04/21 1045    gabapentin (NEURONTIN) capsule 100 mg  100 mg Oral TID Rolanda Magaña MD   100 mg at 06/04/21 1045    FLUoxetine (PROZAC) capsule 20 mg  20 mg Oral Daily Rolanda Magaña MD   20 mg at 06/04/21 1045    pravastatin (PRAVACHOL) tablet 40 mg  40 mg Oral Daily Rolanda Magaña MD   40 mg at 06/04/21 1045    amitriptyline (ELAVIL) tablet 50 mg  50 mg Oral Nightly Rolanda Magaña MD   50 mg at 06/03/21 2245    cloNIDine (CATAPRES) tablet 0.2 mg  0.2 mg Oral Q6H PRN Rolanda Magaña MD   0.2 mg at 06/02/21 0015    vitamin B-12 (CYANOCOBALAMIN) tablet 500 mcg  500 mcg Oral Daily Noni Wolfe MD   500 mcg at 06/04/21 1045    labetalol (NORMODYNE;TRANDATE) injection 10 mg  10 mg Intravenous Q6H PRN Noni Wolfe MD   10 mg at 06/04/21 0505    ferrous sulfate (IRON 325) tablet 325 mg  325 mg Oral BID WC Noni Wolfe MD   325 mg at 06/04/21 1045    sodium chloride flush 0.9 % injection 5-40 mL  5-40 mL Intravenous PRN Leatha Summers MD   10 mL at 06/04/21 0631    LORazepam (ATIVAN) tablet 1 mg  1 mg Oral Q1H PRN Leatha Summers MD        Or    LORazepam (ATIVAN) injection 1 mg  1 mg Intravenous Q1H PRN Leatha Summers MD   1 mg at 06/02/21 0320    Or    LORazepam (ATIVAN) tablet 2 mg  2 mg Oral Q1H PRN Leatha Summers MD        Or    LORazepam (ATIVAN) injection 2 mg  2 mg Intravenous Q1H PRN Leatha Summers MD   2 mg at 06/01/21 2314    Or    LORazepam (ATIVAN) tablet 3 mg  3 mg Oral Q1H PRN Leatha Summers MD        Or    LORazepam (ATIVAN) injection 3 mg  3 mg Intravenous Q1H PRN Leatha Summers MD   3 mg at 06/01/21 1532    Or    LORazepam (ATIVAN) tablet 4 mg  4 mg Oral Q1H PRN Leatha Summers MD        Or    LORazepam (ATIVAN) injection 4 mg  4 mg Intravenous Q1H PRN Farzaneh Vazquez MD   4 mg at 06/02/21 2005    sodium chloride flush 0.9 % injection 5-40 mL  5-40 mL Intravenous 2 times per day Kori Valle MD   10 mL at 06/04/21 1045    sodium chloride flush 0.9 % injection 5-40 mL  5-40 mL Intravenous PRN Kori Valle MD   10 mL at 06/04/21 0118    0.9 % sodium chloride infusion  25 mL Intravenous PRN Kori Valle MD   Stopped at 06/03/21 1915    enoxaparin (LOVENOX) injection 40 mg  40 mg Subcutaneous Daily Kori Valle MD   40 mg at 06/04/21 1045    promethazine (PHENERGAN) tablet 12.5 mg  12.5 mg Oral Q6H PRN Kori Valle MD        Or    ondansetron (ZOFRAN) injection 4 mg  4 mg Intravenous Q6H PRN Kori Valle MD   4 mg at 06/01/21 0221    polyethylene glycol (GLYCOLAX) packet 17 g  17 g Oral Daily PRN Kori Valle MD        acetaminophen (TYLENOL) tablet 650 mg  650 mg Oral Q6H PRN Kori Valle MD   650 mg at 05/30/21 1724    Or    acetaminophen (TYLENOL) suppository 650 mg  650 mg Rectal Q6H PRN Kori Valle MD        glucose (GLUTOSE) 40 % oral gel 15 g  15 g Oral PRN Charli Oshea MD        dextrose 50 % IV solution  12.5 g Intravenous PRN Charli Oshea MD        glucagon (rDNA) injection 1 mg  1 mg Intramuscular PRN Charli Oshea MD        dextrose 5 % solution  100 mL/hr Intravenous PRN Charli Oshea MD         No Known Allergies   reports that he has never smoked. He does not have any smokeless tobacco history on file. He reports current alcohol use. He reports that he does not use drugs.        Objective:  Exam:   Constitutional:   Vitals:    06/04/21 0511 06/04/21 0643 06/04/21 0830 06/04/21 1015   BP: (!) 177/96  (!) 143/89 117/80   Pulse: 91  96 100   Resp:   15 16   Temp:   97.4 °F (36.3 °C) 98 °F (36.7 °C)   TempSrc:   Oral Oral   SpO2:   97% 96%   Weight:  121 lb 1.6 oz (54.9 kg)     Height:         General appearance: Waxing and waning  Mental Status: AAO x1  Poor attention and concentration  Language is fluent  Poor fund of knowledge and immediate recall  Intact remote memory. Cranial Nerves:   II:  Pupils: equal, round, reactive to light  III,IV,VI: Extra Ocular Movements are intact. No nystagmus  V: Facial sensation is intact  VII: Facial strength and movements: intact and symmetric  IX: Palate elevation is symmetric  XI: Shoulder shrug is intact  XII: Tongue movements are normal  Musculoskeletal: Poor effort and generalized diffuse weakness 4/5  Normal tone  No sensory loss  No tremors  No changes       Data:  LABS:   Lab Results   Component Value Date     06/04/2021    K 3.9 06/04/2021     06/04/2021    CO2 25 06/04/2021    BUN 7 06/04/2021    CREATININE 0.6 06/04/2021    GFRAA >60 06/04/2021    LABGLOM >60 06/04/2021    GLUCOSE 268 06/04/2021    MG 1.80 06/04/2021    CALCIUM 8.6 06/04/2021     Lab Results   Component Value Date    WBC 6.4 06/04/2021    RBC 3.46 06/04/2021    HGB 11.6 06/04/2021    HCT 34.2 06/04/2021    MCV 98.8 06/04/2021    RDW 12.4 06/04/2021     06/04/2021   No results found for: INR, PROTIME      I reviewed blood testing and other test results and discussed results with the patient      Impression: no changes  Acute encephalopathy, severe. Likely acute metabolic encephalopathy, multifactorial  Diabetes with hypoglycemia  Alcohol abuse  Generalized weakness and fatigue  Aspiration pneumonia  Hypertension      Recommendation    MRI brain  PT and OT  Speech evaluation  Tele  ISS  DVT and GI px  Echo  ASA  BP monitor for now  DT precautions  Will follow           Robert El MD   [phone]      This dictation was generated by voice recognition computer software. Although all attempts are made to edit the dictation for accuracy, there may be errors in the transcription that are not intended.

## 2021-06-04 NOTE — PROGRESS NOTES
Interpretation services used. #[id number]. Pt stated 8-30 when asked to provide name and . Pt states he does not know where he is. Pt reports chronic numbness and tingling in bilateral feet d/t \"diabetes\". Denies numbness and tingling in hands. Pt unable to give current month/year. Pt uncooperative with assessment otherwise. Multiple questions asked via  and pt does not respond to these. Pt attempting to get out of bed multiple times during assessment and  requested pt to stay in bed.

## 2021-06-04 NOTE — PROGRESS NOTES
education/demonstration with occasional prompting (ongoing 2021)   3. Pt will advance to least restrictive diet as indicated (ongoing 2021)   4. If clinical s/s of aspiration/penetration continue to be noted, Pt will participate in Modified Barium Swallow Study (ongoing 2021)     Diet and Treatment Recommendations:  1) Advance to Dysphagia II Minced and Moist with thin liquids, meds with puree, Straws ok   2) 1;1 assist with intake     Speech Language Treatment:  Impressions: Pt was awake and alert, pt was seen in conjunction with PT/OT upright in bed and then in chair. SLP focused on communication/cognitition. Per chart review, pt's friend reports that he speaks \"perfectly good\" english. Pt was more communicative this date. Attempting to make needs known verbally and with some gestures. He continues to demonstrate inconsistent responses to questions/commands. Pt's baseline is unclear however, chart review reveals hit is reported he was working prior to this event. RN that is able to communicate with pt in [place]ian was in room for portion of session. Speech Language STG, addressed this date: 1. Pt will improve verbal expression via graded tasks to 80%   -Pt did not complete automatic speech tasks; in response to directions pt stated \"no\"  -Pt able to state name   -Pt able to state if he understood instructions   -Able to communicate via yes/no questions   2. Pt will improve auditory processing/comprehension of commands and questions to 80%, via graded tasks   -Pt followed 1 step commands with mod cues with 70% accuracy   -Unable to follow multi step commands   -benefited from simple commands and repetitions     3. Pt will improve orientation and short term recall via graded tasks to 80%  -Oriented to person and  only   -Unable to recall location independently or with choices.  Pt unable to recall location   -Not oriented to temporal concepts     Patient/Family Education:Education given to the Pt and

## 2021-06-05 LAB
ANION GAP SERPL CALCULATED.3IONS-SCNC: 8 MMOL/L (ref 3–16)
BASOPHILS ABSOLUTE: 0.1 K/UL (ref 0–0.2)
BASOPHILS RELATIVE PERCENT: 1.2 %
BUN BLDV-MCNC: 7 MG/DL (ref 7–20)
CALCIUM SERPL-MCNC: 8 MG/DL (ref 8.3–10.6)
CHLORIDE BLD-SCNC: 101 MMOL/L (ref 99–110)
CO2: 26 MMOL/L (ref 21–32)
CREAT SERPL-MCNC: 0.5 MG/DL (ref 0.9–1.3)
EOSINOPHILS ABSOLUTE: 0.2 K/UL (ref 0–0.6)
EOSINOPHILS RELATIVE PERCENT: 4.9 %
GFR AFRICAN AMERICAN: >60
GFR NON-AFRICAN AMERICAN: >60
GLUCOSE BLD-MCNC: 137 MG/DL (ref 70–99)
GLUCOSE BLD-MCNC: 144 MG/DL (ref 70–99)
GLUCOSE BLD-MCNC: 217 MG/DL (ref 70–99)
GLUCOSE BLD-MCNC: 251 MG/DL (ref 70–99)
GLUCOSE BLD-MCNC: 298 MG/DL (ref 70–99)
HCT VFR BLD CALC: 32.5 % (ref 40.5–52.5)
HEMOGLOBIN: 11 G/DL (ref 13.5–17.5)
LYMPHOCYTES ABSOLUTE: 1.6 K/UL (ref 1–5.1)
LYMPHOCYTES RELATIVE PERCENT: 36.9 %
MAGNESIUM: 1.6 MG/DL (ref 1.8–2.4)
MCH RBC QN AUTO: 33.5 PG (ref 26–34)
MCHC RBC AUTO-ENTMCNC: 33.8 G/DL (ref 31–36)
MCV RBC AUTO: 99.1 FL (ref 80–100)
MONOCYTES ABSOLUTE: 0.7 K/UL (ref 0–1.3)
MONOCYTES RELATIVE PERCENT: 16.4 %
NEUTROPHILS ABSOLUTE: 1.7 K/UL (ref 1.7–7.7)
NEUTROPHILS RELATIVE PERCENT: 40.6 %
PDW BLD-RTO: 12.7 % (ref 12.4–15.4)
PERFORMED ON: ABNORMAL
PLATELET # BLD: 238 K/UL (ref 135–450)
PMV BLD AUTO: 7.4 FL (ref 5–10.5)
POTASSIUM REFLEX MAGNESIUM: 3.4 MMOL/L (ref 3.5–5.1)
RBC # BLD: 3.28 M/UL (ref 4.2–5.9)
SODIUM BLD-SCNC: 135 MMOL/L (ref 136–145)
WBC # BLD: 4.3 K/UL (ref 4–11)

## 2021-06-05 PROCEDURE — 6370000000 HC RX 637 (ALT 250 FOR IP): Performed by: INTERNAL MEDICINE

## 2021-06-05 PROCEDURE — 83735 ASSAY OF MAGNESIUM: CPT

## 2021-06-05 PROCEDURE — 97110 THERAPEUTIC EXERCISES: CPT

## 2021-06-05 PROCEDURE — 94760 N-INVAS EAR/PLS OXIMETRY 1: CPT

## 2021-06-05 PROCEDURE — 97530 THERAPEUTIC ACTIVITIES: CPT

## 2021-06-05 PROCEDURE — 80048 BASIC METABOLIC PNL TOTAL CA: CPT

## 2021-06-05 PROCEDURE — 85025 COMPLETE CBC W/AUTO DIFF WBC: CPT

## 2021-06-05 PROCEDURE — 36415 COLL VENOUS BLD VENIPUNCTURE: CPT

## 2021-06-05 PROCEDURE — 6360000002 HC RX W HCPCS: Performed by: FAMILY MEDICINE

## 2021-06-05 PROCEDURE — 2060000000 HC ICU INTERMEDIATE R&B

## 2021-06-05 PROCEDURE — 6370000000 HC RX 637 (ALT 250 FOR IP): Performed by: FAMILY MEDICINE

## 2021-06-05 PROCEDURE — 2580000003 HC RX 258: Performed by: INTERNAL MEDICINE

## 2021-06-05 PROCEDURE — 97535 SELF CARE MNGMENT TRAINING: CPT

## 2021-06-05 PROCEDURE — 97116 GAIT TRAINING THERAPY: CPT

## 2021-06-05 PROCEDURE — 6360000002 HC RX W HCPCS: Performed by: INTERNAL MEDICINE

## 2021-06-05 RX ORDER — MAGNESIUM SULFATE IN WATER 40 MG/ML
4000 INJECTION, SOLUTION INTRAVENOUS ONCE
Status: COMPLETED | OUTPATIENT
Start: 2021-06-05 | End: 2021-06-05

## 2021-06-05 RX ADMIN — LISINOPRIL 10 MG: 10 TABLET ORAL at 08:40

## 2021-06-05 RX ADMIN — INSULIN LISPRO 4 UNITS: 100 INJECTION, SOLUTION INTRAVENOUS; SUBCUTANEOUS at 12:45

## 2021-06-05 RX ADMIN — GABAPENTIN 100 MG: 100 CAPSULE ORAL at 08:40

## 2021-06-05 RX ADMIN — LEVOTHYROXINE SODIUM 50 MCG: 0.03 TABLET ORAL at 05:40

## 2021-06-05 RX ADMIN — FOLIC ACID 1 MG: 1 TABLET ORAL at 08:40

## 2021-06-05 RX ADMIN — FLUOXETINE 20 MG: 20 CAPSULE ORAL at 08:40

## 2021-06-05 RX ADMIN — Medication 10 ML: at 21:24

## 2021-06-05 RX ADMIN — ENOXAPARIN SODIUM 40 MG: 40 INJECTION SUBCUTANEOUS at 08:40

## 2021-06-05 RX ADMIN — AMITRIPTYLINE HYDROCHLORIDE 50 MG: 50 TABLET, FILM COATED ORAL at 21:22

## 2021-06-05 RX ADMIN — THIAMINE HYDROCHLORIDE 100 MG: 100 INJECTION, SOLUTION INTRAMUSCULAR; INTRAVENOUS at 09:45

## 2021-06-05 RX ADMIN — INSULIN LISPRO 6 UNITS: 100 INJECTION, SOLUTION INTRAVENOUS; SUBCUTANEOUS at 08:40

## 2021-06-05 RX ADMIN — CYANOCOBALAMIN TAB 1000 MCG 500 MCG: 1000 TAB at 08:40

## 2021-06-05 RX ADMIN — GABAPENTIN 100 MG: 100 CAPSULE ORAL at 14:15

## 2021-06-05 RX ADMIN — Medication 1 CAPSULE: at 17:30

## 2021-06-05 RX ADMIN — GABAPENTIN 100 MG: 100 CAPSULE ORAL at 21:23

## 2021-06-05 RX ADMIN — SODIUM CHLORIDE 25 ML: 9 INJECTION, SOLUTION INTRAVENOUS at 08:39

## 2021-06-05 RX ADMIN — AMOXICILLIN AND CLAVULANATE POTASSIUM 1 TABLET: 875; 125 TABLET, FILM COATED ORAL at 21:23

## 2021-06-05 RX ADMIN — INSULIN GLARGINE 12 UNITS: 100 INJECTION, SOLUTION SUBCUTANEOUS at 09:45

## 2021-06-05 RX ADMIN — PRAVASTATIN SODIUM 40 MG: 40 TABLET ORAL at 08:40

## 2021-06-05 RX ADMIN — Medication 10 ML: at 08:40

## 2021-06-05 RX ADMIN — MAGNESIUM SULFATE HEPTAHYDRATE 4000 MG: 40 INJECTION, SOLUTION INTRAVENOUS at 08:41

## 2021-06-05 RX ADMIN — Medication 1 CAPSULE: at 08:40

## 2021-06-05 RX ADMIN — POTASSIUM BICARBONATE 40 MEQ: 782 TABLET, EFFERVESCENT ORAL at 09:45

## 2021-06-05 RX ADMIN — INSULIN LISPRO 2 UNITS: 100 INJECTION, SOLUTION INTRAVENOUS; SUBCUTANEOUS at 17:30

## 2021-06-05 RX ADMIN — FERROUS SULFATE TAB 325 MG (65 MG ELEMENTAL FE) 325 MG: 325 (65 FE) TAB at 08:40

## 2021-06-05 RX ADMIN — MAGNESIUM GLUCONATE 500 MG ORAL TABLET 400 MG: 500 TABLET ORAL at 21:23

## 2021-06-05 RX ADMIN — Medication 10 ML: at 09:45

## 2021-06-05 RX ADMIN — MAGNESIUM GLUCONATE 500 MG ORAL TABLET 400 MG: 500 TABLET ORAL at 08:40

## 2021-06-05 RX ADMIN — AMOXICILLIN AND CLAVULANATE POTASSIUM 1 TABLET: 875; 125 TABLET, FILM COATED ORAL at 08:40

## 2021-06-05 RX ADMIN — FERROUS SULFATE TAB 325 MG (65 MG ELEMENTAL FE) 325 MG: 325 (65 FE) TAB at 17:30

## 2021-06-05 ASSESSMENT — PAIN SCALES - GENERAL
PAINLEVEL_OUTOF10: 0

## 2021-06-05 ASSESSMENT — PAIN SCALES - WONG BAKER
WONGBAKER_NUMERICALRESPONSE: 0
WONGBAKER_NUMERICALRESPONSE: 0

## 2021-06-05 NOTE — PROGRESS NOTES
Physical Therapy  Facility/Department: [address]  Daily Treatment Note  NAME: Ann-Marie Le  : 1970  MRN: [id number]    Date of Service: 2021    Discharge Recommendations:Quique Barron scored a 14/24 on the AM-PAC short mobility form. Current research shows that an AM-PAC score of 17 or less is typically not associated with a discharge to the patient's home setting. Based on the patient's AM-PAC score and their current functional mobility deficits, it is recommended that the patient have 5-7 sessions per week of Physical Therapy at d/c to increase the patient's independence. At this time, this patient demonstrates the endurance, and/or tolerance for 3 hours of therapy each day, with a treatment frequency of 5-7x/wk. Please see assessment section for further patient specific details. If patient discharges prior to next session this note will serve as a discharge summary. Please see below for the latest assessment towards goals. PT Equipment Recommendations  Equipment Needed:  (likely RW)    Assessment   Body structures, Functions, Activity limitations: Decreased functional mobility ; Decreased ADL status; Decreased strength;Decreased safe awareness;Decreased cognition;Decreased endurance;Decreased balance;Decreased posture; Increased pain;Decreased coordination  Assessment: Pt improvement but still needing mod/min A x 2 persons for transfers and gait. PT with episodes of freezing during walking and needing to sit. Pt needing skilled PT services to address these mobility and safety issues. PT Education: Goals;PT Role;Plan of Care;Transfer Training;General Safety;Home Exercise Program;Orientation;Precautions; Functional Mobility Training;Gait Training  Patient Education: Pt verbalized understanding but would benefit from repetition  Barriers to Learning: cognition, language  REQUIRES PT FOLLOW UP: Yes  Activity Tolerance  Activity Tolerance: Patient limited by seated rest, pt returning to able to follow commands. Stairs/Curb  Stairs?: No     Balance  Posture: Poor (forward head and trunk. pt able to lift head to sound but unable to maintain upright posture.)  Sitting - Static: Good;-  Sitting - Dynamic: Fair;+  Standing - Static: Fair (with RW)  Standing - Dynamic: Fair (with RW)  Exercises  Hip Flexion: 2x 10 bilat seated marching  Knee Long Arc Quad: 2x10 bilat  Ankle Pumps: 2x10 bilat  Comments: coordination improving with reps. Comment: gown change due to soiled with food. assist needed. G-Code     OutComes Score                                                     AM-PAC Score  AM-PAC Inpatient Mobility Raw Score : 14 (06/05/21 1052)  AM-PAC Inpatient T-Scale Score : 38.1 (06/05/21 1052)  Mobility Inpatient CMS 0-100% Score: 61.29 (06/05/21 1052)  Mobility Inpatient CMS G-Code Modifier : CL (06/05/21 1052)          Goals  Short term goals  Time Frame for Short term goals: Before discharge  Short term goal 1: Pt will complete bed mobility with Mod A - Goal met 6/5  Short term goal 2: Pt will sit EOB x10 minutes with no more than Min A (met 6/4)  Short term goal 3: Pt will complete sit<>stand with Mod A of 2 persons - Goal met 6/5  Short term goal 4: Pt will transfer bed<>chair with Mod A of 2 persons and LRAD - Goal met 6/5  Short term goal 5: Pt to perform bed mob with Supervision. Long term goals  Long term goal 1: Pt to perform transfers SBA. Long term goal 2: Pt to perform amb with AAD and SBA for 30 ft. Patient Goals   Patient goals : None stated    Plan    Plan  Times per week: 5-7x  Current Treatment Recommendations: Strengthening, ROM, Balance Training, Transfer Training, Endurance Training, ADL/Self-care Training, Functional Mobility Training, Gait Training, Cognitive/Perceptual Training, Neuromuscular Re-education, Safety Education & Training, Cognitive Reorientation  Safety Devices  Type of devices:  All fall risk precautions in place, Left in chair, Nurse notified, Derek Reddy in use, Chair alarm in place, Call light within reach, Gait belt, Patient at risk for falls  Restraints  Initially in place: No  Restraints: All seizure precautions in place with bedrails up x4, seizure pads on, and telesitter in room. Nuse stated that we could leave pt in chair at the end of the session.      Therapy Time   Individual Concurrent Group Co-treatment   Time In       1657   Time Out       1029   Minutes       45   Timed Code Treatment Minutes: [address] , [id number]

## 2021-06-05 NOTE — PROGRESS NOTES
Hospital Medicine Progress Note     Date:  6/5/2021    PCP: No primary care provider on file. (Tel: None)    Date of Admission: 5/29/2021        Subjective  No new complaints. Objective  Physical exam:  Vitals: BP (!) 170/96   Pulse 99   Temp 97.7 °F (36.5 °C) (Oral)   Resp 16   Ht 5' 4\" (1.626 m)   Wt 122 lb (55.3 kg)   SpO2 98%   BMI 20.94 kg/m²   Gen: drowsy, NAD  Head: Normocephalic. Atraumatic. Eyes: PERRLA  ENT: Oral mucosa moist  Neck: No JVD. No obvious thyromegaly. CVS: Nml S1S2, no MRG, RRR  Pulmomary: Clear bilaterally. No crackles. No wheezes. Gastrointestinal: Soft, NT/ND. Positive bowel sounds. Musculoskeletal: No edema. Warm  Neuro: Moving extremities spontaneously, alert and oriented to person  Psychiatry: Not agitated  Skin: Warm, dry with normal turgor. No rash      24HR INTAKE/OUTPUT:      Intake/Output Summary (Last 24 hours) at 6/5/2021 1155  Last data filed at 6/4/2021 1723  Gross per 24 hour   Intake 240 ml   Output --   Net 240 ml     I/O last 3 completed shifts: In: 240 [P.O.:240]  Out: -   No intake/output data recorded.       Meds:    magnesium sulfate  4,000 mg Intravenous Once    insulin glargine  12 Units Subcutaneous Daily    insulin lispro  0-12 Units Subcutaneous TID WC    insulin lispro  0-6 Units Subcutaneous Nightly    amoxicillin-clavulanate  1 tablet Oral 2 times per day    lactobacillus  1 capsule Oral BID WC    magnesium oxide  400 mg Oral BID    lisinopril  10 mg Oral Daily    levothyroxine  50 mcg Oral QAM AC    folic acid  1 mg Oral Daily    thiamine  100 mg Intravenous Daily    gabapentin  100 mg Oral TID    FLUoxetine  20 mg Oral Daily    pravastatin  40 mg Oral Daily    amitriptyline  50 mg Oral Nightly    vitamin B-12  500 mcg Oral Daily    ferrous sulfate  325 mg Oral BID WC    sodium chloride flush  5-40 mL Intravenous 2 times per day    enoxaparin  40 mg Subcutaneous Daily       Infusions:    sodium chloride 25 mL (06/05/21 0839)    dextrose           PRN Meds: potassium chloride **OR** potassium alternative oral replacement **OR** potassium chloride, cloNIDine, labetalol, sodium chloride flush, LORazepam **OR** LORazepam **OR** LORazepam **OR** LORazepam **OR** LORazepam **OR** LORazepam **OR** LORazepam **OR** LORazepam, sodium chloride flush, sodium chloride, promethazine **OR** ondansetron, polyethylene glycol, acetaminophen **OR** acetaminophen, glucose, dextrose, glucagon (rDNA), dextrose    Labs/imaging:  CBC:   Recent Labs     06/03/21 0414 06/04/21 0412 06/05/21 0417   WBC 6.9 6.4 4.3   HGB 11.1* 11.6* 11.0*    237 238         BMP:    Recent Labs     06/03/21 0414 06/04/21 0412 06/05/21 0416    138 135*   K 3.8 3.9 3.4*    104 101   CO2 20* 25 26   BUN 8 7 7   CREATININE 0.6* 0.6* 0.5*   GLUCOSE 118* 268* 298*         Hepatic:   No results for input(s): AST, ALT, ALB, BILITOT, ALKPHOS in the last 72 hours. Troponin: No results for input(s): TROPONINI in the last 72 hours. BNP: No results for input(s): BNP in the last 72 hours. INR: No results for input(s): INR in the last 72 hours. Reviewed imaging and reports noted      Assessment:  Active Problems:    Hypoglycemia    New onset seizure ([facility] [address]    HTN (hypertension), benign    Transient alteration of awareness    Alcohol withdrawal syndrome with complication (HCC)    DM (diabetes mellitus), type 2, uncontrolled with complications (HCC)    Anxiety and depression    Diabetic peripheral neuropathy (HCC)    Hyperlipidemia associated with type 2 diabetes mellitus (HCC)    Acquired hypothyroidism    Bilateral pneumonia    Encephalopathy, metabolic    Alcohol abuse    Delirium  Resolved Problems:    * No resolved hospital problems. *        Plan:   Altered mental status  -Unclear etiology, improving  -Could be secondary to combination of hypoglycemia, suspected alcohol withdrawal and pneumonia  -Appreciate neurology recommendations  -CT head negative for anything acute  -MRI brain negative for anything acute  - appreciate Psychiatry reccs      Suspected alcohol withdrawal  -DC Librium, resolved  -Continue as needed Ativan per CIWA only if absolutely necessary  -Continue vitamin B1 and folic acid and supportive care      Bilateral pneumonia  -Completed course of augmentin, suspect aspiration  -Strep and Legionella negative  -Respiratory panel negative        Hypoglycemia present on admission  -Resolved, continue to monitor        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  -A1c 6.5  -Blood sugar stable, continue signs of insulin moderate, continue to monitor      Diabetic peripheral neuropathy  -Continue gabapentin      Hypertension associated with diabetes  - BP improved  -Continue lisinopril 10 mg daily,as needed clonidine and IV labetalol, continue to monitor        Hyperlipidemia associated with diabetes  -Continue statin        Acquired hypothyroidism  -cont Synthroid 50 MCG daily  -Follow-up with PCP and repeat TSH with reflex in 4 to 6 weeks      Anxiety and depression  -Cont Prozac and amitriptyline      Dispo: Patient is medically stable for discharge, awaiting placement to ARU. Diet: Adult Oral Nutrition Supplement; Diabetic Oral Supplement  ADULT DIET;  Dysphagia - Minced and Moist; 4 carb choices (60 gm/meal)    Activity: up with assist  Prophylaxis: lovenox    Code status: Full Code     ----------        Micaela Vargas MD  -------------------------------  Rounding hospitalist

## 2021-06-05 NOTE — PROGRESS NOTES
Occupational Therapy   Occupational Therapy Initial Assessment  Date: 2021   Patient Name: Carmen Gonzalez  MRN: [id number]     : 1970    Date of Service: 2021    Discharge Recommendations: Carmen Gonzalez scored a 13/24 on the AM-PAC ADL Inpatient form. Current research shows that an AM-PAC score of 17 or less is typically not associated with a discharge to the patient's home setting. Based on the patient's AM-PAC score and their current ADL deficits, it is recommended that the patient have 5-7 sessions per week of Occupational Therapy at d/c to increase the patient's independence. At this time, this patient demonstrates the endurance, and/or tolerance for 3 hours of therapy each day, with a treatment frequency of 5-7x/wk. Please see assessment section for further patient specific details. If patient discharges prior to next session this note will serve as a discharge summary. Please see below for the latest assessment towards goals. Assessment   Performance deficits / Impairments: Decreased functional mobility ; Decreased ADL status; Decreased balance;Decreased strength;Decreased high-level IADLs;Decreased safe awareness;Decreased cognition;Decreased coordination;Decreased posture  Assessment: Pt requires frequent verbal/tactile cueing to initiate all ADLs/functional transfers. Pt not at baseline level of functioning, difficulty with assessing pt d/t language barrier and decreased cognition. lethargic upon arrival, becoming more alert with EOB sitting.  pt not at baseline and would benefit from ongoing skilled OT services in order to return to PLOF  Treatment Diagnosis: MRI pending, hypoglycemia  Prognosis: Good;Fair  OT Education: OT Role;Orientation;Transfer Training  Patient Education: safe transfer, orientation-pt is not independent at baseline  Barriers to Learning: cognition, language  REQUIRES OT FOLLOW UP: Yes  Activity Tolerance  Activity Tolerance: Patient Tolerated treatment well;Treatment limited secondary to decreased cognition  Safety Devices  Safety Devices in place: Yes  Type of devices: All fall risk precautions in place;Gait belt;Patient at risk for falls;Call light within reach; Chair alarm in place;Nurse notified (RN in room at [address] [place] [address])  Restraints  Initially in place: No  Restraints: telesitter present, seizure precautions in place           Patient Diagnosis(es): The primary encounter diagnosis was Hypoglycemia. Diagnoses of Alcohol abuse and Hypoglycemia secondary to sulfonylurea, accidental or unintentional, sequela were also pertinent to this visit. has a past medical history of Diabetes mellitus ([facility] [address] has no past surgical history on file. Treatment Diagnosis: MRI pending, hypoglycemia      Restrictions  Restrictions/Precautions  Restrictions/Precautions: Fall Risk, Seizure (high fall risk)  Position Activity Restriction  Other position/activity restrictions: The patient is a 45y.o.  years old male with history of diabetes who was admitted to the hospital few days ago with acute encephalopathy. He was found unresponsive and unconscious in his own apartment by his friend. Unclear duration but according to report the patient was drinking few beers on the day of the incident. His friend went outside and came back and found him unresponsive. Degree was severe. Other associated symptoms included low blood sugar level when he arrived to the ED. No witnessed seizure-like activity, tongue biting or bladder incontinence. No recent fever chills or head trauma. Degree was severe. Unclear duration. No other relieving or aggravating factors. Initial work-up in the ED with metabolic blood testing was unremarkable except for anemia and fluctuating blood sugar level. He was admitted. Over the last few days, he remained confused and agitated. He received DT precaution with Ativan and Librium. Now is waxing and waning.   Opens eyes to voice and goes back to sleep. There has been some conflicting report from friends about his drinking habits. His UDS on admission was negative. Blood test today was unremarkable except for his anemia. He had his EEG yesterday which showed no epileptiform discharges. Other review of system was limited    Subjective   General  Chart Reviewed: Yes  Patient assessed for rehabilitation services?: Yes  Additional Pertinent Hx: Swedish speaking, language line used  Family / Caregiver Present: No  Diagnosis: hypoglycemia  Subjective  Subjective: Patient was supine in bed upon arrival to therapy session, patient was agreeable to therapy session. RN was present at beginning of session. Patient primarily speaks [place]ic, but able to understand some [place]. RN explained purpose and role of OT/PT to patient in [facility] [place] Patient Currently in Pain: Denies  Pain Assessment  Pain Assessment: 0-10  Pain Level: 0  Vital Signs  Patient Currently in Pain: Denies  Social/Functional History  Social/Functional History  Lives With: Family (brother)  Type of Home: House  Home Layout: Two level  Home Access:  (pt reports he has steps to enter the home, unsure of how many)  Bathroom Shower/Tub: Tub/Shower unit  Bathroom Equipment: Shower chair, Grab bars in shower  ADL Assistance: Independent  Homemaking Assistance: Independent  Ambulation Assistance: Independent  Transfer Assistance: Independent  Active : No  Type of occupation: pt reports he does not work  Additional Comments: difficulty obtaining PLOF from pt, language line used,  reporting \"I do not always understand the pt. \" pt reports he has had falls in the last 6 months.  pt reports he lost consciousness and he was seen at the hospital       Objective        Orientation  Overall Orientation Status: Impaired  Orientation Level: Oriented to person;Disoriented to situation;Disoriented to time;Disoriented to place     Balance  Sitting Balance: Stand by assistance  Standing Balance: and trunk to seated position during stand to sit transfer. Cognition  Overall Cognitive Status: Exceptions  Arousal/Alertness: Delayed responses to stimuli;Inconsistent responses to stimuli  Following Commands: Follows one step commands with increased time; Follows one step commands with repetition  Attention Span: Attends with cues to redirect; Difficulty attending to directions  Memory: Decreased recall of biographical Information;Decreased short term memory;Decreased long term memory;Decreased recall of recent events  Safety Judgement: Decreased awareness of need for assistance;Decreased awareness of need for safety  Problem Solving: Assistance required to generate solutions;Assistance required to implement solutions;Assistance required to identify errors made;Assistance required to correct errors made  Insights: Decreased awareness of deficits  Initiation: Requires cues for some  Sequencing: Requires cues for some  Cognition Comment: pt would switch between speaking in [place] and [facility] [place] pt's sentences were nonsensible and did not make sense given the current context or conversation. Perception  Overall Perceptual Status: Impaired  Initiation: Cues to initiate tasks (Tactile cueing for initiation)  Motor Planning: Cues to use objects appropriately (Hand over foot to sequence step pattern)        Type of ROM/Therapeutic Exercise  Comment: Pt participated in seated balance exercises. Therapist provided target in different planes, challenging pt to reach out of MARJORIE to touch target. Pt completed x20 exercises (x10 with each UE). No LOB, cues to reach all the way to the target, pt was able to acheive 16/20 targets.          Plan   Plan  Times per week: 5-7  Times per day: Daily  Plan weeks: cotx  Current Treatment Recommendations: Strengthening, Patient/Caregiver Education & Training, Balance Training, Neuromuscular Re-education, Functional Mobility Training, Cognitive Reorientation, Cognitive/Perceptual Training, Self-Care / ADL, Safety Education & Training    AM-PAC Score        AM-PAC Inpatient Daily Activity Raw Score: 13 (06/05/21 1100)  AM-PAC Inpatient ADL T-Scale Score : 32.03 (06/05/21 1100)  ADL Inpatient CMS 0-100% Score: 63.03 (06/05/21 1100)  ADL Inpatient CMS G-Code Modifier : CL (06/05/21 1100)    Goals  Short term goals  Time Frame for Short term goals: discharge  Short term goal 1: bed mobility modA-GOAL MET 6/4  Short term goal 2: functional ADL transfer maxA- GOAL MET 6/5  Short term goal 3: sitting balance for ADL tasks Antonio-ongoing 6/5  Short term goal 4: standing tolerance ~1-2 minutes with assist of 1-Ax2 6/05  Short term goal 5: UB ADLs and grooming Antonio-ModA 6/05  Long term goals  Time Frame for Long term goals : STG=LTG  Long term goal 1: LB ADLs maxA - ongoing 6/05       Therapy Time   Individual Concurrent Group Co-treatment   Time In       0950   Time Out       1030   Minutes       40   Timed Code Treatment Minutes: Ton 605, OTR/L -- [id number]

## 2021-06-05 NOTE — PLAN OF CARE
Problem: Pain:  Goal: Pain level will decrease  6/4/2021 2327 by Joel Soriano  Outcome: Ongoing     Problem: Skin Integrity:  Goal: Will show no infection signs and symptoms  Outcome: Ongoing     Problem: Falls - Risk of:  Goal: Will remain free from falls  6/4/2021 2327 by Joel Soriano  Outcome: Ongoing     Problem: Nutrition  Goal: Optimal nutrition therapy  Outcome: Ongoing     Problem: Discharge Planning:  Goal: Discharged to appropriate level of care  Outcome: Ongoing     Problem: Discharge Planning:  Goal: Discharged to appropriate level of care  Outcome: Ongoing

## 2021-06-06 LAB
ANION GAP SERPL CALCULATED.3IONS-SCNC: 9 MMOL/L (ref 3–16)
BASOPHILS ABSOLUTE: 0 K/UL (ref 0–0.2)
BASOPHILS RELATIVE PERCENT: 0.6 %
BUN BLDV-MCNC: 9 MG/DL (ref 7–20)
CALCIUM SERPL-MCNC: 8.7 MG/DL (ref 8.3–10.6)
CHLORIDE BLD-SCNC: 101 MMOL/L (ref 99–110)
CO2: 27 MMOL/L (ref 21–32)
CREAT SERPL-MCNC: 0.6 MG/DL (ref 0.9–1.3)
EOSINOPHILS ABSOLUTE: 0.2 K/UL (ref 0–0.6)
EOSINOPHILS RELATIVE PERCENT: 4.3 %
GFR AFRICAN AMERICAN: >60
GFR NON-AFRICAN AMERICAN: >60
GLUCOSE BLD-MCNC: 121 MG/DL (ref 70–99)
GLUCOSE BLD-MCNC: 152 MG/DL (ref 70–99)
GLUCOSE BLD-MCNC: 177 MG/DL (ref 70–99)
GLUCOSE BLD-MCNC: 208 MG/DL (ref 70–99)
GLUCOSE BLD-MCNC: 307 MG/DL (ref 70–99)
HCT VFR BLD CALC: 33.9 % (ref 40.5–52.5)
HEMOGLOBIN: 11.6 G/DL (ref 13.5–17.5)
LYMPHOCYTES ABSOLUTE: 1.4 K/UL (ref 1–5.1)
LYMPHOCYTES RELATIVE PERCENT: 31.1 %
MAGNESIUM: 2 MG/DL (ref 1.8–2.4)
MCH RBC QN AUTO: 33.3 PG (ref 26–34)
MCHC RBC AUTO-ENTMCNC: 34.2 G/DL (ref 31–36)
MCV RBC AUTO: 97.3 FL (ref 80–100)
MONOCYTES ABSOLUTE: 0.6 K/UL (ref 0–1.3)
MONOCYTES RELATIVE PERCENT: 11.9 %
NEUTROPHILS ABSOLUTE: 2.4 K/UL (ref 1.7–7.7)
NEUTROPHILS RELATIVE PERCENT: 52.1 %
PDW BLD-RTO: 12.4 % (ref 12.4–15.4)
PERFORMED ON: ABNORMAL
PLATELET # BLD: 270 K/UL (ref 135–450)
PMV BLD AUTO: 7.7 FL (ref 5–10.5)
POTASSIUM REFLEX MAGNESIUM: 4 MMOL/L (ref 3.5–5.1)
RBC # BLD: 3.48 M/UL (ref 4.2–5.9)
SODIUM BLD-SCNC: 137 MMOL/L (ref 136–145)
WBC # BLD: 4.7 K/UL (ref 4–11)

## 2021-06-06 PROCEDURE — 6370000000 HC RX 637 (ALT 250 FOR IP): Performed by: INTERNAL MEDICINE

## 2021-06-06 PROCEDURE — 85025 COMPLETE CBC W/AUTO DIFF WBC: CPT

## 2021-06-06 PROCEDURE — 2060000000 HC ICU INTERMEDIATE R&B

## 2021-06-06 PROCEDURE — 6360000002 HC RX W HCPCS: Performed by: FAMILY MEDICINE

## 2021-06-06 PROCEDURE — 6360000002 HC RX W HCPCS: Performed by: INTERNAL MEDICINE

## 2021-06-06 PROCEDURE — 36415 COLL VENOUS BLD VENIPUNCTURE: CPT

## 2021-06-06 PROCEDURE — 80048 BASIC METABOLIC PNL TOTAL CA: CPT

## 2021-06-06 PROCEDURE — 83735 ASSAY OF MAGNESIUM: CPT

## 2021-06-06 PROCEDURE — 6370000000 HC RX 637 (ALT 250 FOR IP): Performed by: FAMILY MEDICINE

## 2021-06-06 PROCEDURE — 2580000003 HC RX 258: Performed by: INTERNAL MEDICINE

## 2021-06-06 RX ORDER — GAUZE BANDAGE 2" X 2"
100 BANDAGE TOPICAL DAILY
Status: DISCONTINUED | OUTPATIENT
Start: 2021-06-07 | End: 2021-06-07 | Stop reason: HOSPADM

## 2021-06-06 RX ADMIN — INSULIN GLARGINE 12 UNITS: 100 INJECTION, SOLUTION SUBCUTANEOUS at 10:05

## 2021-06-06 RX ADMIN — GABAPENTIN 100 MG: 100 CAPSULE ORAL at 21:04

## 2021-06-06 RX ADMIN — CYANOCOBALAMIN TAB 1000 MCG 500 MCG: 1000 TAB at 10:06

## 2021-06-06 RX ADMIN — INSULIN LISPRO 2 UNITS: 100 INJECTION, SOLUTION INTRAVENOUS; SUBCUTANEOUS at 17:57

## 2021-06-06 RX ADMIN — LISINOPRIL 10 MG: 10 TABLET ORAL at 10:07

## 2021-06-06 RX ADMIN — MAGNESIUM GLUCONATE 500 MG ORAL TABLET 400 MG: 500 TABLET ORAL at 21:04

## 2021-06-06 RX ADMIN — Medication 10 ML: at 13:20

## 2021-06-06 RX ADMIN — Medication 1 CAPSULE: at 17:56

## 2021-06-06 RX ADMIN — Medication 10 ML: at 10:11

## 2021-06-06 RX ADMIN — ENOXAPARIN SODIUM 40 MG: 40 INJECTION SUBCUTANEOUS at 10:06

## 2021-06-06 RX ADMIN — FLUOXETINE 20 MG: 20 CAPSULE ORAL at 10:07

## 2021-06-06 RX ADMIN — Medication 10 ML: at 21:04

## 2021-06-06 RX ADMIN — INSULIN LISPRO 2 UNITS: 100 INJECTION, SOLUTION INTRAVENOUS; SUBCUTANEOUS at 10:05

## 2021-06-06 RX ADMIN — FOLIC ACID 1 MG: 1 TABLET ORAL at 10:07

## 2021-06-06 RX ADMIN — MAGNESIUM GLUCONATE 500 MG ORAL TABLET 400 MG: 500 TABLET ORAL at 10:06

## 2021-06-06 RX ADMIN — AMITRIPTYLINE HYDROCHLORIDE 50 MG: 50 TABLET, FILM COATED ORAL at 21:04

## 2021-06-06 RX ADMIN — CLONIDINE HYDROCHLORIDE 0.2 MG: 0.1 TABLET ORAL at 10:07

## 2021-06-06 RX ADMIN — THIAMINE HYDROCHLORIDE 100 MG: 100 INJECTION, SOLUTION INTRAMUSCULAR; INTRAVENOUS at 13:20

## 2021-06-06 RX ADMIN — PRAVASTATIN SODIUM 40 MG: 40 TABLET ORAL at 21:06

## 2021-06-06 RX ADMIN — GABAPENTIN 100 MG: 100 CAPSULE ORAL at 13:21

## 2021-06-06 RX ADMIN — LEVOTHYROXINE SODIUM 50 MCG: 0.03 TABLET ORAL at 07:07

## 2021-06-06 RX ADMIN — FERROUS SULFATE TAB 325 MG (65 MG ELEMENTAL FE) 325 MG: 325 (65 FE) TAB at 10:07

## 2021-06-06 RX ADMIN — INSULIN LISPRO 8 UNITS: 100 INJECTION, SOLUTION INTRAVENOUS; SUBCUTANEOUS at 12:25

## 2021-06-06 RX ADMIN — FERROUS SULFATE TAB 325 MG (65 MG ELEMENTAL FE) 325 MG: 325 (65 FE) TAB at 17:56

## 2021-06-06 RX ADMIN — GABAPENTIN 100 MG: 100 CAPSULE ORAL at 10:07

## 2021-06-06 RX ADMIN — Medication 1 CAPSULE: at 10:06

## 2021-06-06 ASSESSMENT — PAIN SCALES - WONG BAKER
WONGBAKER_NUMERICALRESPONSE: 0

## 2021-06-06 ASSESSMENT — PAIN SCALES - GENERAL
PAINLEVEL_OUTOF10: 0

## 2021-06-06 NOTE — PLAN OF CARE
Problem: Pain:  Goal: Pain level will decrease  Outcome: Ongoing     Problem: Skin Integrity:  Goal: Will show no infection signs and symptoms  Outcome: Ongoing     Problem: Falls - Risk of:  Goal: Will remain free from falls  Outcome: Ongoing     Problem: Nutrition  Goal: Optimal nutrition therapy  Outcome: Ongoing     Problem: Discharge Planning:  Goal: Discharged to appropriate level of care  Outcome: Ongoing

## 2021-06-06 NOTE — PLAN OF CARE
Assessment complete, see flow sheet. Blood pressure 160/103, will give catapres per prn orders. Blood sugar 177, will give scheduled lantus & medium dose correctional per parameters. Impulsive, to bathroom & back to chair when getting out of chair prior to assessment. Will continue to monitor. Boo Alexander RN, BSN, PCCN.

## 2021-06-06 NOTE — PLAN OF CARE
Re-Assessment complete, see flow sheet. /80   Pulse 90   Temp 98 °F (36.7 °C) (Oral)   Resp 16   Ht 5' 4\" (1.626 m)   Wt 121 lb (54.9 kg)   SpO2 99%   BMI 20.77 kg/m²  room air, normal sinus rhythm. Denies pain, no complaints voiced. Blood sugar 152, will give correctional insulin with dinner. Will continue to monitor. Flor Martell RN, BSN, PCCN.

## 2021-06-06 NOTE — PROGRESS NOTES
Hospital Medicine Progress Note     Date:  6/6/2021    PCP: No primary care provider on file. (Tel: None)    Date of Admission: 5/29/2021        Subjective  No new complaints. Objective  Physical exam:  Vitals: BP (!) 157/105   Pulse 109   Temp 98.1 °F (36.7 °C) (Oral)   Resp 18   Ht 5' 4\" (1.626 m)   Wt 121 lb (54.9 kg)   SpO2 99%   BMI 20.77 kg/m²   Gen: drowsy, NAD  Head: Normocephalic. Atraumatic. Eyes: PERRLA  ENT: Oral mucosa moist  Neck: No JVD. No obvious thyromegaly. CVS: Nml S1S2, no MRG, RRR  Pulmomary: Clear bilaterally. No crackles. No wheezes. Gastrointestinal: Soft, NT/ND. Positive bowel sounds. Musculoskeletal: No edema. Warm  Neuro: Moving extremities spontaneously, alert and oriented to person  Psychiatry: Not agitated  Skin: Warm, dry with normal turgor. No rash      24HR INTAKE/OUTPUT:      Intake/Output Summary (Last 24 hours) at 6/6/2021 1409  Last data filed at 6/6/2021 1300  Gross per 24 hour   Intake 1080 ml   Output --   Net 1080 ml     I/O last 3 completed shifts: In: 3059.8 [P.O.:840;  I.V.:1866.9; IV Piggyback:352.8]  Out: -   I/O this shift:  In: 600 [P.O.:600]  Out: -       Meds:    insulin glargine  12 Units Subcutaneous BID    insulin lispro  0-12 Units Subcutaneous TID WC    insulin lispro  0-6 Units Subcutaneous Nightly    lactobacillus  1 capsule Oral BID WC    magnesium oxide  400 mg Oral BID    lisinopril  10 mg Oral Daily    levothyroxine  50 mcg Oral QAM AC    folic acid  1 mg Oral Daily    thiamine  100 mg Intravenous Daily    gabapentin  100 mg Oral TID    FLUoxetine  20 mg Oral Daily    pravastatin  40 mg Oral Daily    amitriptyline  50 mg Oral Nightly    vitamin B-12  500 mcg Oral Daily    ferrous sulfate  325 mg Oral BID WC    sodium chloride flush  5-40 mL Intravenous 2 times per day    enoxaparin  40 mg Subcutaneous Daily       Infusions:    sodium chloride Stopped (06/05/21 1255)    dextrose           PRN Meds: potassium chloride **OR** potassium alternative oral replacement **OR** potassium chloride, cloNIDine, labetalol, sodium chloride flush, LORazepam **OR** LORazepam **OR** LORazepam **OR** LORazepam **OR** LORazepam **OR** LORazepam **OR** LORazepam **OR** LORazepam, sodium chloride flush, sodium chloride, promethazine **OR** ondansetron, polyethylene glycol, acetaminophen **OR** acetaminophen, glucose, dextrose, glucagon (rDNA), dextrose    Labs/imaging:  CBC:   Recent Labs     06/04/21  0412 06/05/21  0417 06/06/21  0415   WBC 6.4 4.3 4.7   HGB 11.6* 11.0* 11.6*    238 270         BMP:    Recent Labs     06/04/21 0412 06/05/21 0416 06/06/21 0415    135* 137   K 3.9 3.4* 4.0    101 101   CO2 25 26 27   BUN 7 7 9   CREATININE 0.6* 0.5* 0.6*   GLUCOSE 268* 298* 208*         Hepatic:   No results for input(s): AST, ALT, ALB, BILITOT, ALKPHOS in the last 72 hours. Troponin: No results for input(s): TROPONINI in the last 72 hours. BNP: No results for input(s): BNP in the last 72 hours. INR: No results for input(s): INR in the last 72 hours. Reviewed imaging and reports noted      Assessment:  Active Problems:    Hypoglycemia    New onset seizure ([facility] [address]    HTN (hypertension), benign    Transient alteration of awareness    Alcohol withdrawal syndrome with complication (HCC)    DM (diabetes mellitus), type 2, uncontrolled with complications (HCC)    Anxiety and depression    Diabetic peripheral neuropathy (HCC)    Hyperlipidemia associated with type 2 diabetes mellitus (HCC)    Acquired hypothyroidism    Bilateral pneumonia    Encephalopathy, metabolic    Alcohol abuse    Delirium  Resolved Problems:    * No resolved hospital problems. *        Plan:   Altered mental status  -Unclear etiology, improving  -Could be secondary to combination of hypoglycemia, suspected alcohol withdrawal and pneumonia  -Appreciate neurology recommendations  -CT head negative for anything acute  -MRI brain negative for anything acute  - appreciate Psychiatry reccs      Suspected alcohol withdrawal  -DC Librium, resolved  -Continue as needed Ativan per CIWA only if absolutely necessary  -Continue vitamin B1 and folic acid and supportive care      Bilateral pneumonia  -Completed course of augmentin, suspect aspiration  -Strep and Legionella negative  -Respiratory panel negative        Hypoglycemia present on admission  -Resolved, continue to monitor        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  -A1c 6.5  -Blood sugars variable  -Increase Lantus to 12 units twice daily, continue sliding scale insulin moderate, continue to monitor      Diabetic peripheral neuropathy  -Continue gabapentin      Hypertension associated with diabetes  - BP improved  -Continue lisinopril 10 mg daily,as needed clonidine and IV labetalol, continue to monitor        Hyperlipidemia associated with diabetes  -Continue statin        Acquired hypothyroidism  -cont Synthroid 50 MCG daily  -Follow-up with PCP and repeat TSH with reflex in 4 to 6 weeks      Anxiety and depression  -Cont Prozac and amitriptyline      Dispo: Patient is medically stable for discharge, awaiting placement to ARU. Diet: Adult Oral Nutrition Supplement; Diabetic Oral Supplement  ADULT DIET;  Dysphagia - Minced and Moist; 4 carb choices (60 gm/meal)    Activity: up with assist  Prophylaxis: lovenox    Code status: Full Code     ----------        Renée Miller MD  -------------------------------  Rounding hospitalist

## 2021-06-07 VITALS
RESPIRATION RATE: 16 BRPM | HEIGHT: 64 IN | DIASTOLIC BLOOD PRESSURE: 98 MMHG | WEIGHT: 121 LBS | OXYGEN SATURATION: 97 % | HEART RATE: 107 BPM | SYSTOLIC BLOOD PRESSURE: 152 MMHG | TEMPERATURE: 97.9 F | BODY MASS INDEX: 20.66 KG/M2

## 2021-06-07 LAB
ANION GAP SERPL CALCULATED.3IONS-SCNC: 7 MMOL/L (ref 3–16)
BASOPHILS ABSOLUTE: 0 K/UL (ref 0–0.2)
BASOPHILS RELATIVE PERCENT: 0.5 %
BUN BLDV-MCNC: 11 MG/DL (ref 7–20)
CALCIUM SERPL-MCNC: 8.7 MG/DL (ref 8.3–10.6)
CHLORIDE BLD-SCNC: 102 MMOL/L (ref 99–110)
CO2: 28 MMOL/L (ref 21–32)
CREAT SERPL-MCNC: 0.6 MG/DL (ref 0.9–1.3)
EOSINOPHILS ABSOLUTE: 0.2 K/UL (ref 0–0.6)
EOSINOPHILS RELATIVE PERCENT: 4.3 %
GFR AFRICAN AMERICAN: >60
GFR NON-AFRICAN AMERICAN: >60
GLUCOSE BLD-MCNC: 131 MG/DL (ref 70–99)
GLUCOSE BLD-MCNC: 133 MG/DL (ref 70–99)
GLUCOSE BLD-MCNC: 156 MG/DL (ref 70–99)
GLUCOSE BLD-MCNC: 264 MG/DL (ref 70–99)
HCT VFR BLD CALC: 33.9 % (ref 40.5–52.5)
HEMOGLOBIN: 11.5 G/DL (ref 13.5–17.5)
LYMPHOCYTES ABSOLUTE: 1.7 K/UL (ref 1–5.1)
LYMPHOCYTES RELATIVE PERCENT: 35.3 %
MCH RBC QN AUTO: 33 PG (ref 26–34)
MCHC RBC AUTO-ENTMCNC: 33.8 G/DL (ref 31–36)
MCV RBC AUTO: 97.7 FL (ref 80–100)
MONOCYTES ABSOLUTE: 0.5 K/UL (ref 0–1.3)
MONOCYTES RELATIVE PERCENT: 9.4 %
NEUTROPHILS ABSOLUTE: 2.5 K/UL (ref 1.7–7.7)
NEUTROPHILS RELATIVE PERCENT: 50.5 %
PDW BLD-RTO: 12.6 % (ref 12.4–15.4)
PERFORMED ON: ABNORMAL
PLATELET # BLD: 310 K/UL (ref 135–450)
PMV BLD AUTO: 7.7 FL (ref 5–10.5)
POTASSIUM REFLEX MAGNESIUM: 4 MMOL/L (ref 3.5–5.1)
RBC # BLD: 3.47 M/UL (ref 4.2–5.9)
SODIUM BLD-SCNC: 137 MMOL/L (ref 136–145)
WBC # BLD: 4.9 K/UL (ref 4–11)

## 2021-06-07 PROCEDURE — 6370000000 HC RX 637 (ALT 250 FOR IP): Performed by: INTERNAL MEDICINE

## 2021-06-07 PROCEDURE — 97129 THER IVNTJ 1ST 15 MIN: CPT

## 2021-06-07 PROCEDURE — 2580000003 HC RX 258: Performed by: INTERNAL MEDICINE

## 2021-06-07 PROCEDURE — 92507 TX SP LANG VOICE COMM INDIV: CPT

## 2021-06-07 PROCEDURE — 6360000002 HC RX W HCPCS: Performed by: INTERNAL MEDICINE

## 2021-06-07 PROCEDURE — 97530 THERAPEUTIC ACTIVITIES: CPT

## 2021-06-07 PROCEDURE — 92526 ORAL FUNCTION THERAPY: CPT

## 2021-06-07 PROCEDURE — 97535 SELF CARE MNGMENT TRAINING: CPT

## 2021-06-07 PROCEDURE — 85025 COMPLETE CBC W/AUTO DIFF WBC: CPT

## 2021-06-07 PROCEDURE — 36415 COLL VENOUS BLD VENIPUNCTURE: CPT

## 2021-06-07 PROCEDURE — 97116 GAIT TRAINING THERAPY: CPT

## 2021-06-07 PROCEDURE — 6370000000 HC RX 637 (ALT 250 FOR IP): Performed by: FAMILY MEDICINE

## 2021-06-07 PROCEDURE — 94760 N-INVAS EAR/PLS OXIMETRY 1: CPT

## 2021-06-07 PROCEDURE — 80048 BASIC METABOLIC PNL TOTAL CA: CPT

## 2021-06-07 RX ORDER — FOLIC ACID 1 MG/1
1 TABLET ORAL DAILY
Qty: 30 TABLET | Refills: 0 | Status: SHIPPED | OUTPATIENT
Start: 2021-06-08

## 2021-06-07 RX ORDER — FLUOXETINE HYDROCHLORIDE 20 MG/1
20 CAPSULE ORAL DAILY
Qty: 30 CAPSULE | Refills: 0 | Status: SHIPPED | OUTPATIENT
Start: 2021-06-07

## 2021-06-07 RX ORDER — 0.9 % SODIUM CHLORIDE 0.9 %
1000 INTRAVENOUS SOLUTION INTRAVENOUS ONCE
Status: COMPLETED | OUTPATIENT
Start: 2021-06-07 | End: 2021-06-07

## 2021-06-07 RX ORDER — FERROUS SULFATE 325(65) MG
325 TABLET ORAL 2 TIMES DAILY WITH MEALS
Qty: 60 TABLET | Refills: 0 | Status: SHIPPED | OUTPATIENT
Start: 2021-06-07

## 2021-06-07 RX ORDER — LEVOTHYROXINE SODIUM 0.05 MG/1
50 TABLET ORAL
Qty: 30 TABLET | Refills: 0 | Status: SHIPPED | OUTPATIENT
Start: 2021-06-08

## 2021-06-07 RX ORDER — INSULIN LISPRO 100 [IU]/ML
0-12 INJECTION, SOLUTION INTRAVENOUS; SUBCUTANEOUS
Qty: 10.8 ML | Refills: 0 | Status: SHIPPED | OUTPATIENT
Start: 2021-06-07 | End: 2021-07-07

## 2021-06-07 RX ORDER — GLUCOSAMINE HCL/CHONDROITIN SU 500-400 MG
1 CAPSULE ORAL
Qty: 100 STRIP | Refills: 0 | Status: SHIPPED | OUTPATIENT
Start: 2021-06-07

## 2021-06-07 RX ORDER — AMITRIPTYLINE HYDROCHLORIDE 50 MG/1
50 TABLET, FILM COATED ORAL NIGHTLY
Qty: 30 TABLET | Refills: 0 | Status: SHIPPED | OUTPATIENT
Start: 2021-06-07

## 2021-06-07 RX ORDER — GABAPENTIN 100 MG/1
100 CAPSULE ORAL 3 TIMES DAILY
Qty: 90 CAPSULE | Refills: 0 | Status: SHIPPED | OUTPATIENT
Start: 2021-06-07 | End: 2021-07-07

## 2021-06-07 RX ORDER — BLOOD-GLUCOSE METER
1 KIT MISCELLANEOUS
Qty: 1 KIT | Refills: 0 | Status: SHIPPED | OUTPATIENT
Start: 2021-06-07

## 2021-06-07 RX ORDER — LISINOPRIL 10 MG/1
10 TABLET ORAL DAILY
Qty: 30 TABLET | Refills: 0 | Status: SHIPPED | OUTPATIENT
Start: 2021-06-08

## 2021-06-07 RX ORDER — THIAMINE MONONITRATE (VIT B1) 100 MG
100 TABLET ORAL DAILY
Qty: 30 TABLET | Refills: 0 | Status: SHIPPED | OUTPATIENT
Start: 2021-06-08

## 2021-06-07 RX ORDER — POLYETHYLENE GLYCOL 3350 17 G/17G
17 POWDER, FOR SOLUTION ORAL
Qty: 527 G | Refills: 0 | Status: SHIPPED | OUTPATIENT
Start: 2021-06-07 | End: 2021-07-07

## 2021-06-07 RX ORDER — PRAVASTATIN SODIUM 40 MG
40 TABLET ORAL NIGHTLY
Qty: 30 TABLET | Refills: 0 | Status: SHIPPED | OUTPATIENT
Start: 2021-06-07

## 2021-06-07 RX ADMIN — CYANOCOBALAMIN TAB 1000 MCG 500 MCG: 1000 TAB at 09:28

## 2021-06-07 RX ADMIN — Medication 1 CAPSULE: at 09:29

## 2021-06-07 RX ADMIN — INSULIN LISPRO 6 UNITS: 100 INJECTION, SOLUTION INTRAVENOUS; SUBCUTANEOUS at 11:35

## 2021-06-07 RX ADMIN — MAGNESIUM GLUCONATE 500 MG ORAL TABLET 400 MG: 500 TABLET ORAL at 09:28

## 2021-06-07 RX ADMIN — Medication 100 MG: at 09:29

## 2021-06-07 RX ADMIN — Medication 10 ML: at 09:29

## 2021-06-07 RX ADMIN — FLUOXETINE 20 MG: 20 CAPSULE ORAL at 09:29

## 2021-06-07 RX ADMIN — LEVOTHYROXINE SODIUM 50 MCG: 0.03 TABLET ORAL at 06:49

## 2021-06-07 RX ADMIN — GABAPENTIN 100 MG: 100 CAPSULE ORAL at 09:29

## 2021-06-07 RX ADMIN — GABAPENTIN 100 MG: 100 CAPSULE ORAL at 13:37

## 2021-06-07 RX ADMIN — FERROUS SULFATE TAB 325 MG (65 MG ELEMENTAL FE) 325 MG: 325 (65 FE) TAB at 09:29

## 2021-06-07 RX ADMIN — SODIUM CHLORIDE 1000 ML: 9 INJECTION, SOLUTION INTRAVENOUS at 06:02

## 2021-06-07 RX ADMIN — FOLIC ACID 1 MG: 1 TABLET ORAL at 09:29

## 2021-06-07 RX ADMIN — ENOXAPARIN SODIUM 40 MG: 40 INJECTION SUBCUTANEOUS at 09:28

## 2021-06-07 ASSESSMENT — PAIN SCALES - GENERAL
PAINLEVEL_OUTOF10: 0

## 2021-06-07 ASSESSMENT — PAIN SCALES - WONG BAKER
WONGBAKER_NUMERICALRESPONSE: 0

## 2021-06-07 NOTE — PLAN OF CARE
Problem: Pain:  Goal: Pain level will decrease  6/7/2021 0147 by Irineo Mendes  Outcome: Ongoing     Problem: Skin Integrity:  Goal: Will show no infection signs and symptoms  6/7/2021 0147 by Irineo Mendes  Outcome: Ongoing     Problem: Nutrition  Goal: Optimal nutrition therapy  6/7/2021 0147 by Irineo Mendes  Outcome: Ongoing     Problem: Discharge Planning:  Goal: Discharged to appropriate level of care  6/7/2021 0147 by Irineo Mendes  Outcome: Ongoing

## 2021-06-07 NOTE — CARE COORDINATION
PM&R referral received. Chart reviewed. Unable to accept patient to ARU at this time. Dr. Gema Kirby and Shivam Mars, updated. ARU will continue to follow progress and update discharge plan as needed.     Shirley Ross, BSN, .[phone]

## 2021-06-07 NOTE — PROGRESS NOTES
Physical Therapy  Facility/Department: [address]  Daily Treatment Note  NAME: Michelle Lamas  : 1970  MRN: [id number]    Date of Service: 2021    Discharge Recommendations:  Michelle Lamas scored a 15/24 on the AM-PAC short mobility form. Current research shows that an AM-PAC score of 17 or less is typically not associated with a discharge to the patient's home setting. Based on the patient's AM-PAC score and their current functional mobility deficits, it is recommended that the patient have 5-7 sessions per week of Physical Therapy at d/c to increase the patient's independence. At this time, this patient demonstrates the endurance, and/or tolerance for 3 hours of therapy each day, with a treatment frequency of 5-7x/wk. Please see assessment section for further patient specific details. If patient discharges prior to next session this note will serve as a discharge summary. Please see below for the latest assessment towards goals. Assessment   Body structures, Functions, Activity limitations: Decreased functional mobility ; Decreased ADL status; Decreased strength;Decreased safe awareness;Decreased cognition;Decreased endurance;Decreased balance;Decreased posture; Increased pain;Decreased coordination  Assessment: pt had a hypotensive episode on this date. BP at end of session was 97/64. pt requires CGA for all mobility, but pt has extremely unstable BP requiring 2 people to be present for safety if Max A is needed for a sudden hypotensive episode. pt was able to follow commands 75% of the time on this date and understand the majority of commands in [address] or through visual/tactile cues. Pt would benefit from additional skilled PT to return to baseline and be safe to return home. Treatment Diagnosis: Impaired functional mobility, impaired motor control  PT Education: General Safety;Gait Training;Transfer Training;PT Role;Goals; Disease Specific Education  Patient Education: Pt educated on orthostatic hypotension. Pt verbalized understanding but would benefit from repetition  Barriers to Learning: cognition, language  REQUIRES PT FOLLOW UP: Yes  Activity Tolerance  Activity Tolerance: Other;Patient Tolerated treatment well  Activity Tolerance: pt limited due to unstable BP and orthostatic hypotension. Patient Diagnosis(es): The primary encounter diagnosis was Hypoglycemia. Diagnoses of Alcohol abuse and Hypoglycemia secondary to sulfonylurea, accidental or unintentional, sequela were also pertinent to this visit. has a past medical history of Diabetes mellitus ([facility] [address] has no past surgical history on file. Restrictions  Restrictions/Precautions  Restrictions/Precautions: Fall Risk, Seizure (High Fall Risk)  Position Activity Restriction  Other position/activity restrictions: The patient is a 45y.o.  years old male with history of diabetes who was admitted to the hospital few days ago with acute encephalopathy. He was found unresponsive and unconscious in his own apartment by his friend. Unclear duration but according to report the patient was drinking few beers on the day of the incident. His friend went outside and came back and found him unresponsive. Degree was severe. Other associated symptoms included low blood sugar level when he arrived to the ED. No witnessed seizure-like activity, tongue biting or bladder incontinence. No recent fever chills or head trauma. Degree was severe. Unclear duration. No other relieving or aggravating factors. Initial work-up in the ED with metabolic blood testing was unremarkable except for anemia and fluctuating blood sugar level. He was admitted. Over the last few days, he remained confused and agitated. He received DT precaution with Ativan and Librium. Now is waxing and waning. Opens eyes to voice and goes back to sleep. There has been some conflicting report from friends about his drinking habits.   His UDS on admission was negative. Blood test today was unremarkable except for his anemia. He had his EEG yesterday which showed no epileptiform discharges. Other review of system was limited  Subjective   General  Chart Reviewed: Yes  Family / Caregiver Present: No  Subjective  Subjective: Pt supine in bed upon PT arrival. Pt denies pain at rest. Agreeable to working with PT. Pain Screening  Patient Currently in Pain: Denies  Vital Signs  Patient Currently in Pain: Denies       Orientation  Orientation  Overall Orientation Status: Impaired  Orientation Level: Oriented to person;Disoriented to situation;Disoriented to time;Disoriented to place (Hard to assess due to possible language barrier.)  Cognition   Cognition  Overall Cognitive Status: Exceptions  Arousal/Alertness: Delayed responses to stimuli  Following Commands: Follows one step commands with increased time; Follows one step commands with repetition  Attention Span: Attends with cues to redirect  Memory: Appears intact (language barrier prevents full recolection.)  Safety Judgement: Decreased awareness of need for assistance;Decreased awareness of need for safety  Problem Solving: Assistance required to generate solutions;Assistance required to implement solutions;Assistance required to identify errors made;Assistance required to correct errors made  Insights: Decreased awareness of deficits  Initiation: Requires cues for some  Sequencing: Requires cues for some  Objective   Bed mobility  Supine to Sit: Stand by assistance  Scooting: Stand by assistance  Comment: Supine BP: 120/80  Transfers  Sit to Stand: Stand by assistance (BP 87/55 after initial sit>stand>sit)  Stand to sit: 2 Person Assistance;Dependent/Total;Contact guard assistance (CGA for first and last stand to sit.  Max A x2 for STS in bathroom documented below.)  Bed to Chair: Contact guard assistance (CGA Bed>Chair.)  Comment: Max A x2 for stand to sit in bathroom to a standard chair due to presumed extreme drop in BP while performing ADLs in standing causing pt dizziness and extreme extension tone. pt transfered from bathroom back to [address] [place] [address]. BP once in a stable position near monitor was 91/62. Once pt was stable, standing for ADLs was attempted. BP following ADLs in standing was 83/59. Ambulation  Ambulation?: Yes  Ambulation 1  Surface: level tile  Device: No Device  Assistance: Contact guard assistance;2 Person assistance (CGA x 2 persons due to known orthostatic safety.)  Quality of Gait: forward lean, increased lateral sway  Gait Deviations: Slow Katie;Decreased step length;Decreased step height;Shuffles  Distance: 20' from recliner to bathroom. Stairs/Curb  Stairs?: No     Balance  Posture: Fair (pt demonstrated a more upright posture on this date. Continues to have some forward head and trunk positioning.)  Sitting - Static: Good (pt sat EOB with CGA and in Recliner with CGA. ~10 min)  Sitting - Dynamic: Good (pt able to perform ADLs seated in chair with CGA. no LOB noted. ~5 min)  Standing - Dynamic: Poor; Fair (pt able to perform ADLs standing in bathroom and in front of chair ~15 min. One incidence of Dynamic balance loss when performing ADLs in bathroom due to a presumed sudden drop in BP requiring a Max A x2 for balance and to lower pt to the chair behind.)      AROM RLE (degrees)  RLE AROM: WFL  RLE General AROM: Assessed through observation of ADLs. AROM LLE (degrees)  LLE AROM : WFL  LLE General AROM: Assessed through observation of ADLs.         AM-PAC Score  AM-PAC Inpatient Mobility Raw Score : 15 (06/07/21 0955)  AM-PAC Inpatient T-Scale Score : 39.45 (06/07/21 0955)  Mobility Inpatient CMS 0-100% Score: 57.7 (06/07/21 0955)  Mobility Inpatient CMS G-Code Modifier : CK (06/07/21 0955)          Goals  Short term goals  Time Frame for Short term goals: Before discharge  Short term goal 1: Pt will complete bed mobility with Mod A - Goal met 6/5  Short term goal 2: Pt will sit

## 2021-06-07 NOTE — PROGRESS NOTES
however would benefit from continued reinforcement for carryover  Barriers to Learning: cognition, language  REQUIRES OT FOLLOW UP: Yes  Activity Tolerance  Activity Tolerance: Patient Tolerated treatment well;Treatment limited secondary to medical complications (free text)  Activity Tolerance: Initial BP taken supine-120/80. BP taken following initial sit <> stand from EOB, 87/55. Pt reporting dizziness in stance in restroom-B UEs hyperextended in stance in bathroom d/t presumed sudden drop in BP, requiring MaxAx2 to assist to chair within bathroom. Recovered quickly with seated rest break BP taken seated in recliner chair 91/62, following seated rest break and standing ADLs BP 83/59. Safety Devices  Safety Devices in place: Yes  Type of devices: All fall risk precautions in place;Gait belt;Patient at risk for falls;Call light within reach; Left in chair;Chair alarm in place;Nurse notified (RN in room upon EOS) notified RN to utilize [address] for further transfers d/t hypotensive occurrence during session      Patient Diagnosis(es): The primary encounter diagnosis was Hypoglycemia. Diagnoses of Alcohol abuse and Hypoglycemia secondary to sulfonylurea, accidental or unintentional, sequela were also pertinent to this visit. has a past medical history of Diabetes mellitus ([facility] [address] has no past surgical history on file. Restrictions  Restrictions/Precautions  Restrictions/Precautions: Fall Risk, Seizure (High Fall Risk)  Position Activity Restriction  Other position/activity restrictions: The patient is a 45y.o.  years old male with history of diabetes who was admitted to the hospital few days ago with acute encephalopathy. He was found unresponsive and unconscious in his own apartment by his friend. Unclear duration but according to report the patient was drinking few beers on the day of the incident. His friend went outside and came back and found him unresponsive. Degree was severe.   Other associated symptoms included low blood sugar level when he arrived to the ED. No witnessed seizure-like activity, tongue biting or bladder incontinence. No recent fever chills or head trauma. Degree was severe. Unclear duration. No other relieving or aggravating factors. Initial work-up in the ED with metabolic blood testing was unremarkable except for anemia and fluctuating blood sugar level. He was admitted. Over the last few days, he remained confused and agitated. He received DT precaution with Ativan and Librium. Now is waxing and waning. Opens eyes to voice and goes back to sleep. There has been some conflicting report from friends about his drinking habits. His UDS on admission was negative. Blood test today was unremarkable except for his anemia. He had his EEG yesterday which showed no epileptiform discharges. Other review of system was limited  Subjective   General  Chart Reviewed: Yes  Patient assessed for rehabilitation services?: Yes  Additional Pertinent Hx: Swazi speaking, language line used  Family / Caregiver Present: No  Diagnosis: hypoglycemia  Subjective  Subjective: Pt supine in bed upon arrival, pt agreeable to OT/PT cotx session. Pt primary language Slovak but is able to understand and respond in broken [place]. Denies Pn and oriented to self and . Orientation  Orientation  Overall Orientation Status: Impaired  Orientation Level: Oriented to person;Disoriented to place; Disoriented to time;Disoriented to situation  Objective    ADL  Grooming: Increased time to complete;Setup; Other (Comment) (CGA in stance at room chair for oral care, required visual cueing to problem solve how to wet toothbrush)  UE Bathing: Setup (facewash seated in room chair)  LE Bathing: Setup;Contact guard assistance (seated in chair, B LE sponge bath.  CGA for safety during dynamic balance)  UE Dressing: Increased time to complete;Minimal assistance (gown change, Mo for gown orientation)  LE Dressing: Cognition  Overall Cognitive Status: Exceptions  Arousal/Alertness: Delayed responses to stimuli  Following Commands: Follows one step commands with increased time; Follows one step commands with repetition  Attention Span: Attends with cues to redirect  Memory: Appears intact  Safety Judgement: Decreased awareness of need for assistance;Decreased awareness of need for safety  Problem Solving: Assistance required to generate solutions;Assistance required to implement solutions;Assistance required to identify errors made;Assistance required to correct errors made  Insights: Decreased awareness of deficits  Initiation: Requires cues for some  Sequencing: Requires cues for some     Perception  Overall Perceptual Status: Impaired  Initiation: Cues to initiate tasks  Motor Planning: Cues to use objects appropriately (visual cueing required to problem solve/sequence performing oral care       Plan   Plan  Times per week: 5-7  Times per day: Daily  Plan weeks: cotx  Current Treatment Recommendations: Strengthening, Patient/Caregiver Education & Training, Balance Training, Neuromuscular Re-education, Functional Mobility Training, Cognitive Reorientation, Cognitive/Perceptual Training, Self-Care / ADL, Safety Education & Training     AM-Merged with [facility] Inpatient Daily Activity Raw Score: 17 (06/07/21 1004)  AM-PAC Inpatient ADL T-Scale Score : 37.26 (06/07/21 1004)  ADL Inpatient CMS 0-100% Score: 50.11 (06/07/21 1004)  ADL Inpatient CMS G-Code Modifier : CK (06/07/21 1004)    Goals  Short term goals  Time Frame for Short term goals: discharge  Short term goal 1: bed mobility modA-GOAL MET 6/4  Short term goal 2: functional ADL transfer maxA- GOAL MET 6/5  Short term goal 3: sitting balance for ADL tasks Antonio-GOAL MET 6/07  Short term goal 4: standing tolerance ~1-2 minutes with assist of 1-ongoing 6/07  Short term goal 5: UB ADLs and grooming Antonio-ongoing 6/07  Long term goals  Time Frame for Long term goals : STG=LTG  Long term goal 1: LB ADLs maxA - ongoing 6/07       Therapy Time   Individual Concurrent Group Co-treatment   Time In       4348   Time Out       0923   Minutes       44     Timed Code Treatment Minutes:  44 Minutes  Total Treatment Minutes:  44 minutes    JAYLA Charles/TEDDY    [facility], [id number]    TEDDY provided direct supervision to student, facilitated in making skilled judgements throughout duration of session.

## 2021-06-07 NOTE — PROGRESS NOTES
Nutrition Assessment     Type and Reason for Visit: Reassess    Nutrition Recommendations/Plan:   Continue Glucerna BID. Nutrition Assessment:  Nutrition status has improved. PO intake % of meals and pt is taking Glucerna. No nutrition concerns at this time. Malnutrition Assessment:  Malnutrition Status: No malnutrition      Nutrition Related Findings: LBM 6/5; disoriented to situation      Current Nutrition Therapies:    Adult Oral Nutrition Supplement; Diabetic Oral Supplement  ADULT DIET;  Dysphagia - Soft and Bite Sized; 4 carb choices (60 gm/meal)    Anthropometric Measures:  · Height: 5' 4\" (162.6 cm)  · Current Body Wt: 121 lb (54.9 kg)   · BMI: 20.8    Nutrition Diagnosis:   No nutrition diagnosis at this time     Nutrition Interventions:   Food and/or Nutrient Delivery:  Continue Current Diet, Continue Oral Nutrition Supplement  Nutrition Education/Counseling:  Education not indicated   Coordination of Nutrition Care:  Continue to monitor while inpatient    Goals:  Pt will consume at least 50% of meals and supplements       Nutrition Monitoring and Evaluation:   Behavioral-Environmental Outcomes:  None Identified   Food/Nutrient Intake Outcomes:  Food and Nutrient Intake, Supplement Intake  Physical Signs/Symptoms Outcomes:  None Identified     Discharge Planning:    No discharge needs at this time, Continue current diet, Continue Oral Nutrition Supplement     Electronically signed by Abigail Das RD, LD on 6/7/21 at 11:48 AM EDT  Contact: [phone]

## 2021-06-07 NOTE — CARE COORDINATION
Notified MD via [employer],   Patient does not have a community payer, only hospital medicaid. Needs a PMR consult, please. Requested another visit by PT/OT. Referred to [facility] since [facility] documented their intent to deny.

## 2021-06-07 NOTE — CARE COORDINATION
[facility] and [facility] cannot accept due to lack of payer. Met with patient to offer assistance with discharge, and he has no home care agency preference. [facility] to [facility], pending a home care order. RN  aware that Notified patient,  his wife, and brother that patient is discharged today. MD notified via [employer] Rules:  1. Can ONLY be done Monday- Friday between 8:30am-3:30pm.  2. Prescription(s) must be in pharmacy by 3:30pm. to be filled same day or for the weekend  3. Copy of patient's face sheet or insurance/ID should be included. 4. Patient qualifies for financial assistance per the financial counselor. Cost of Rx cannot be added to hospital bill. 5. Patients can then  the prescription on their way out of the hospital at discharge, or pharmacy can deliver to the bedside if staff is available. (otherwise, payment due at time of pick-up or delivery - cash, check, or card accepted)   Patient and his wife were provided a blood pressure cuff through outpatient pharmacy. A list of pharmacy assistance programs was provided including [facility], and [employer], etc.  His PCP is Dr Jayne Faustin at  [facility].

## 2021-06-07 NOTE — PLAN OF CARE
Problem: Pain:  Goal: Pain level will decrease  Description: Pain level will decrease  6/7/2021 0803 by Chava Dean RN  Outcome: Ongoing  6/7/2021 0147 by Bashir Jones  Outcome: Ongoing  Goal: Control of acute pain  Description: Control of acute pain  Outcome: Ongoing  Goal: Control of chronic pain  Description: Control of chronic pain  Outcome: Ongoing     Problem: Skin Integrity:  Goal: Will show no infection signs and symptoms  Description: Will show no infection signs and symptoms  6/7/2021 0803 by Chava Dean RN  Outcome: Ongoing  6/7/2021 0147 by Bashir Jones  Outcome: Ongoing  Goal: Absence of new skin breakdown  Description: Absence of new skin breakdown  Outcome: Ongoing     Problem: Falls - Risk of:  Goal: Will remain free from falls  Description: Will remain free from falls  6/7/2021 0803 by Chava Dean RN  Outcome: Ongoing  6/7/2021 0147 by Bashir Jones  Outcome: Ongoing  Goal: Absence of physical injury  Description: Absence of physical injury  Outcome: Ongoing     Problem: Nutrition  Goal: Optimal nutrition therapy  6/7/2021 0803 by Chava Dean RN  Outcome: Ongoing  6/7/2021 0147 by Bashir Jones  Outcome: Ongoing     Problem: Discharge Planning:  Goal: Discharged to appropriate level of care  Description: Discharged to appropriate level of care  6/7/2021 0803 by Chava Dean RN  Outcome: Ongoing  6/7/2021 0147 by Bashir Jones  Outcome: Ongoing

## 2021-06-07 NOTE — DISCHARGE SUMMARY
Hospital Discharge Summary    Patient's PCP: No primary care provider on file. Admit Date: 5/29/2021   Discharge Date: 6/7/2021    Admitting Physician: Dr. Prerna Hoffman MD  Discharge Physician: Dr. Rosenda Almodovar MD     Consults:   IP CONSULT TO NEUROLOGY  IP CONSULT TO PSYCHIATRY  IP CONSULT TO PHYSICAL MEDICINE REHAB  IP CONSULT TO HOME CARE NEEDS    Brief HPI:   Kayla Ramos is a 45 y.o. male culture when I see the patient unable to give much history. . 83 patient was brought in with a friend as the patient was slumped over unconscious around 5:30 AM.  Patient has been with his friend watching television around this time. Patient had 4 beers prior to this. Patient did take his diabetic medication include Metformin and glyburide last night. Per friend patient does not eat much at baseline. Patient presented to ED was found to be hypoglycemic. Given amp of D5W and remained hypoglycemia started on D5W drip    Brief hospital course:    Altered mental status  -Unclear etiology, improved  -Could be secondary to combination of hypoglycemia, suspected alcohol withdrawal and pneumonia  -Appreciate neurology recommendations  -CT head negative for anything acute  -MRI brain negative for anything acute  - appreciate Psychiatry reccs        Suspected alcohol withdrawal  -DC Librium, resolved  -Continue as needed Ativan per CIWA only if absolutely necessary  -Continue vitamin B1 and folic acid and supportive care        Bilateral pneumonia  -Completed course of augmentin, suspect aspiration  -Strep and Legionella negative  -Respiratory panel negative           Hypoglycemia present on admission  -Resolved, continue to monitor           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  -A1c 6.5  -Blood sugars variable  -Increase Lantus to 12 units twice daily, continue sliding scale insulin moderate, continue to monitor        Diabetic peripheral neuropathy  -Continue gabapentin        Hypertension associated with diabetes  - BP improved  -Continue lisinopril 10 mg daily,as needed clonidine and IV labetalol, continue to monitor           Hyperlipidemia associated with diabetes  -Continue statin           Acquired hypothyroidism  -cont Synthroid 50 MCG daily  -Follow-up with PCP and repeat TSH with reflex in 4 to 6 weeks        Anxiety and depression  -Cont Prozac and amitriptyline    Patient is medically stable for discharge, he will need to follow-up with his PCP in 1 week. Discharge Diagnoses: Active Problems:    Hypoglycemia    New onset seizure ([address]    HTN (hypertension), benign    Transient alteration of awareness    Alcohol withdrawal syndrome with complication (HCC)    DM (diabetes mellitus), type 2, uncontrolled with complications (HCC)    Anxiety and depression    Diabetic peripheral neuropathy (HCC)    Hyperlipidemia associated with type 2 diabetes mellitus (HCC)    Acquired hypothyroidism    Bilateral pneumonia    Encephalopathy, metabolic    Alcohol abuse    Delirium  Resolved Problems:    * No resolved hospital problems. *      Physical Exam: BP (!) 152/98   Pulse 107   Temp 97.9 °F (36.6 °C) (Oral)   Resp 16   Ht 5' 4\" (1.626 m)   Wt 121 lb (54.9 kg)   SpO2 97%   BMI 20.77 kg/m²   Gen/overall appearance: Not in acute distress. Alert. Head: Normocephalic, atraumatic  Eyes: EOMI, good acuity  ENT:- Oral mucosa moist  Neck: No JVD, thyromegaly  CVS: Nml S1S2, no MRG, RRR  Pulm: Clear bilaterally. No crackles/wheezes  Gastrointestinal: Soft, NT/ND, +BS  Musculoskeletal: No edema. Warm  Neuro: Moves extremity spontaneously. Psychiatry: Appropriate affect. Not agitated. Skin: Warm, dry with normal turgor.  No rash        Significant diagnostic studies that may require follow up:  CT HEAD WO CONTRAST    Result Date: 6/2/2021  EXAMINATION: CT OF THE HEAD WITHOUT CONTRAST  6/2/2021 11:41 am TECHNIQUE: CT of the head was performed without the administration of intravenous contrast. Dose modulation, iterative reconstruction, and/or weight based adjustment of the mA/kV was utilized to reduce the radiation dose to as low as reasonably achievable. COMPARISON: None. HISTORY: ORDERING SYSTEM PROVIDED HISTORY: AMS TECHNOLOGIST PROVIDED HISTORY: Reason for exam:->AMS Has a \"code stroke\" or \"stroke alert\" been called? ->No Reason for Exam: AMS Acuity: Unknown Type of Exam: Unknown FINDINGS: BRAIN/VENTRICLES: There is no acute intracranial hemorrhage, mass effect or midline shift. No abnormal extra-axial fluid collection. The gray-white differentiation is maintained without evidence of an acute infarct. There is no evidence of hydrocephalus. ORBITS: The visualized portion of the orbits demonstrate no acute abnormality. SINUSES: The visualized paranasal sinuses and mastoid air cells demonstrate no acute abnormality. SOFT TISSUES/SKULL:  No acute abnormality of the visualized skull or soft tissues. No acute intracranial abnormality. XR CHEST PORTABLE    Result Date: 6/1/2021  EXAMINATION: ONE XRAY VIEW OF THE CHEST 6/1/2021 5:15 pm COMPARISON: 05/30/2021 HISTORY: ORDERING SYSTEM PROVIDED HISTORY: f/u pna TECHNOLOGIST PROVIDED HISTORY: Reason for exam:->f/u pna Reason for Exam: f/u pna Acuity: Acute Type of Exam: Subsequent/Follow-up FINDINGS: The heart is normal.  The pulmonary vessels are less prominent. There is some hazy opacity along the left perihilar region extending inferiorly which is more prominent. The right lung is clear. No obvious effusion is seen. Minimal central congestion is more apparent. Hazy left perihilar opacity extending inferiorly which is more prominent and could represent worsening pneumonia. XR CHEST PORTABLE    Result Date: 5/30/2021  EXAMINATION: ONE XRAY VIEW OF THE CHEST 5/30/2021 8:36 am COMPARISON: None.  HISTORY: ORDERING SYSTEM PROVIDED HISTORY: r/o pna TECHNOLOGIST PROVIDED HISTORY: Reason for exam:->r/o pna Reason for Exam: R/o pna Acuity: Unknown Type of Exam: Unknown FINDINGS: Poor inspiratory effort. Heart size and pulmonary vasculature within normal limits when accounting for projection and lung volumes. Ground-glass opacities in the lower lung zones, left greater than right. Bandlike opacities in the medial right lung base     Bilateral lower lung zone pneumonia more severe on the left     MRI BRAIN WO CONTRAST    Result Date: 6/4/2021  EXAMINATION: MRI OF THE BRAIN WITHOUT CONTRAST  6/4/2021 4:20 pm TECHNIQUE: Multiplanar multisequence MRI of the brain was performed without the administration of intravenous contrast. COMPARISON: None. HISTORY: ORDERING SYSTEM PROVIDED HISTORY: AMS TECHNOLOGIST PROVIDED HISTORY: Reason for exam:->AMS Reason for Exam: Altered mental status x few days Acuity: Acute Type of Exam: Initial FINDINGS: INTRACRANIAL STRUCTURES/VENTRICLES: There is no acute infarct or mass. No mass effect or midline shift. No evidence of an acute intracranial hemorrhage. The ventricles and sulci are normal in size and configuration. The sellar/suprasellar regions appear unremarkable. The normal signal voids within the major intracranial vessels appear maintained. ORBITS: Orbital structures are unremarkable. SINUSES: Paranasal sinuses are well aerated. Trace right mastoid effusion is present. No left mastoid effusion is evident. BONES/SOFT TISSUES: The bone marrow signal intensity appears normal. The soft tissues demonstrate no acute abnormality. 1. No acute intracranial abnormality. 2. Trace right mastoid effusion. Treatments: As above.       Discharge Medications:     Medication List      START taking these medications    blood glucose test strips  1 strip by Other route 4 times daily (before meals and nightly) E11.9     cyanocobalamin 500 MCG tablet  Take 1 tablet by mouth daily  Start taking on: June 8, 2021     ferrous sulfate 325 (65 Fe) MG tablet  Commonly known as: IRON 325  Take 1 tablet by mouth 2 times daily (with meals)     folic acid 1 MG tablet  Commonly known as: FOLVITE  Take 1 tablet by mouth daily  Start taking on: June 8, 2021     glucose monitoring kit monitoring kit  1 kit by Does not apply route 4 times daily (with meals and nightly) E11.9     insulin glargine 100 UNIT/ML injection pen  Commonly known as: LANTUS;BASAGLAR  Inject 12 Units into the skin 2 times daily     insulin lispro (1 Unit Dial) 100 UNIT/ML Sopn  Inject 0-12 Units into the skin 3 times daily (with meals) **Medium Dose Corrective Algorithm**  Glucose: Dose:  If <139             No Insulin  140-199 2 Units  200-249 4 Units  250-299 6 Units  300-349 8 Units  350-400 10 Units  Above 400       12 Units     Lancets 30G Misc  1 each by Does not apply route 4 times daily (before meals and nightly) E11.9     levothyroxine 50 MCG tablet  Commonly known as: SYNTHROID  Take 1 tablet by mouth every morning (before breakfast)  Start taking on: June 8, 2021     magnesium oxide 400 (241.3 Mg) MG Tabs tablet  Commonly known as: MAG-OX  Take 1 tablet by mouth 2 times daily     metoprolol tartrate 25 MG tablet  Commonly known as: LOPRESSOR  Take 1 tablet by mouth 2 times daily HOLD IF SBP < 130     Needles & Syringes Misc  1 each by Does not apply route 4 times daily (before meals and nightly) E11.9     polyethylene glycol 17 g packet  Commonly known as: GLYCOLAX  Take 17 g by mouth every 72 hours     vitamin B-1 100 MG tablet  Commonly known as: THIAMINE  Take 1 tablet by mouth daily  Start taking on: June 8, 2021        CHANGE how you take these medications    amitriptyline 50 MG tablet  Commonly known as: ELAVIL  Take 1 tablet by mouth nightly  What changed:   · medication strength  · how much to take  · Another medication with the same name was removed.  Continue taking this medication, and follow the directions you see here.     gabapentin 100 MG capsule  Commonly known as: NEURONTIN  Take 1 capsule by mouth 3 times daily for 30 days.  What changed: Another medication with the same name was removed. Continue taking this medication, and follow the directions you see here. lisinopril 10 MG tablet  Commonly known as: PRINIVIL;ZESTRIL  Take 1 tablet by mouth daily  Start taking on: June 8, 2021  What changed:   · medication strength  · how much to take     pravastatin 40 MG tablet  Commonly known as: PRAVACHOL  Take 1 tablet by mouth nightly  What changed:   · when to take this  · Another medication with the same name was removed. Continue taking this medication, and follow the directions you see here. CONTINUE taking these medications    FLUoxetine 20 MG capsule  Commonly known as: PROZAC  Take 1 capsule by mouth daily        STOP taking these medications    glipiZIDE 10 MG tablet  Commonly known as: GLUCOTROL     GLYBURIDE PO     metFORMIN 1000 MG tablet  Commonly known as: GLUCOPHAGE     naproxen 500 MG tablet  Commonly known as: Naprosyn     PARoxetine 20 MG tablet  Commonly known as: PAXIL           Where to Get Your Medications      You can get these medications from any pharmacy    Bring a paper prescription for each of these medications  · amitriptyline 50 MG tablet  · blood glucose test strips  · cyanocobalamin 500 MCG tablet  · ferrous sulfate 325 (65 Fe) MG tablet  · FLUoxetine 20 MG capsule  · folic acid 1 MG tablet  · gabapentin 100 MG capsule  · glucose monitoring kit monitoring kit  · insulin glargine 100 UNIT/ML injection pen  · insulin lispro (1 Unit Dial) 100 UNIT/ML Sopn  · Lancets 30G Misc  · levothyroxine 50 MCG tablet  · lisinopril 10 MG tablet  · magnesium oxide 400 (241.3 Mg) MG Tabs tablet  · metoprolol tartrate 25 MG tablet  · Needles & Syringes Misc  · polyethylene glycol 17 g packet  · pravastatin 40 MG tablet  · vitamin B-1 100 MG tablet         Activity: activity as tolerated  Diet: Adult Oral Nutrition Supplement; Diabetic Oral Supplement  ADULT DIET;  Dysphagia - Soft and Bite Sized; 4 carb choices (60

## 2021-06-07 NOTE — PROGRESS NOTES
[facility] home care referral. Spoke with pt and re: home care plan of care/services. Agreeable. Demographic's verified. Aware of discharge with home care. Home care orders sent to [facility]. Discharge planner notified.

## 2021-06-07 NOTE — PROGRESS NOTES
Patient being discharged, IV and tele removed. Discharge instructions reviewed and questions answered with patient and family at bedside. Waiting for outpatient pharmacy to bring meds to bedside then will transport patient to [facility].

## 2021-06-07 NOTE — PROGRESS NOTES
Pt ambulated to restroom w/ contact guard assistance after obtaining AM vitals. Upon standing to urinate pt became unable to stand and was gently lowered onto his knees in bathroom. Pt reported becoming very dizzy suddenly. Pt able to get back onto his feet and ambulate to the bed w/ 2x staff assistance. Pt denies further dizziness or any pain. Will notify Hospitalist via [employer].

## 2021-06-07 NOTE — DISCHARGE INSTR - COC
Continuity of Care Form    Patient Name: Carmenza Martell   :  1970  MRN:  [id number]    Admit date:  2021  Discharge date:  21    Code Status Order: Full Code   Advance Directives:   [facility] Documentation       Date/Time Healthcare Directive Type of Healthcare Directive Copy in [facility] [address] Agent's Name Healthcare Agent's Phone Number    21  No, patient does not have an advance directive for healthcare treatment -- -- -- -- --            Admitting Physician:  Ary Elizabeth MD  PCP: No primary care provider on file. Discharging Nurse: [facility] Unit/Room#: 3TN-3361/3361-01  Discharging Unit Phone Number: [phone]    Emergency Contact:   No emergency contact information on file. Past Surgical History:  No past surgical history on file. Immunization History: There is no immunization history on file for this patient.     Active Problems:  Patient Active Problem List   Diagnosis Code    Hypoglycemia E16.2    New onset seizure ([address] R56.9    HTN (hypertension), benign I10    Transient alteration of awareness R40.4    Alcohol withdrawal syndrome with complication ([address] Z30.092    DM (diabetes mellitus), type 2, uncontrolled with complications ([address] W98.2, E11.65    Anxiety and depression F41.9, F32.9    Diabetic peripheral neuropathy ([address] E11.42    Hyperlipidemia associated with type 2 diabetes mellitus ([address] E11.69, E78.5    Acquired hypothyroidism E03.9    Bilateral pneumonia J18.9    Encephalopathy, metabolic W49.16    Alcohol abuse F10.10    Delirium R41.0       Isolation/Infection:   Isolation            No Isolation          Patient Infection Status       Infection Onset Added Last Indicated Last Indicated By Review Planned Expiration Resolved Resolved By    None active    Resolved    COVID-19 Rule Out 21 Respiratory Panel, Molecular, with COVID-19 (Restricted: peds pts or suitable admitted adults) (Ordered)   06/02/21 Rule-Out Test Resulted            Nurse Assessment:  Last Vital Signs: BP (!) 152/98   Pulse 107   Temp 97.9 °F (36.6 °C) (Oral)   Resp 16   Ht 5' 4\" (1.626 m)   Wt 121 lb (54.9 kg)   SpO2 97%   BMI 20.77 kg/m²     Last documented pain score (0-10 scale): Pain Level: 0  Last Weight:   Wt Readings from Last 1 Encounters:   06/06/21 121 lb (54.9 kg)     Mental Status:  alert    IV Access:  - None    Nursing Mobility/ADLs:  Walking   Assisted  Transfer  Assisted  Bathing  Assisted  Dressing  Assisted  Toileting  Assisted  Feeding  Assisted  Med Admin  Assisted  Med Delivery   prefers mixed with applesauce    Wound Care Documentation and Therapy:        Elimination:  Continence:   · Bowel: No  · Bladder: No  Urinary Catheter: None   Colostomy/Ileostomy/Ileal Conduit: No       Date of Last BM: 6/5    Intake/Output Summary (Last 24 hours) at 6/7/2021 1210  Last data filed at 6/6/2021 1807  Gross per 24 hour   Intake 720 ml   Output --   Net 720 ml     I/O last 3 completed shifts: In: 840 [P.O.:840]  Out: -     Safety Concerns:     History of Falls (last 30 days)    Impairments/Disabilities:      None    Nutrition Therapy:  Current Nutrition Therapy:   - Oral Diet:  Dental Soft    Routes of Feeding: Oral  Liquids: Thin Liquids  Daily Fluid Restriction: no  Last Modified Barium Swallow with Video (Video Swallowing Test): not done    Treatments at the Time of Hospital Discharge:   Respiratory Treatments: ***  Oxygen Therapy:  is not on home oxygen therapy.   Ventilator:    - No ventilator support    Rehab Therapies: SN,PT,OT,MSW  Weight Bearing Status/Restrictions: No weight bearing restirctions  Other Medical Equipment (for information only, NOT a DME order):  ***  Other Treatments: HOME HEALTH CARE: LEVEL 3 SAFETY        -Initial home health evaluation to occur within 24-48 hours, in patient home    -Home health agency to establish plan of care for patient over 60 day period    -Medication Reconciliation    -PT/OT/Speech evaluations in home within 24-48 hours of discharge; including  -DME and home safety    -Frontload therapy 5 days, then 3x a week    -OT to evaluate if patient has [address] needs for personal care    - evaluation within 24-48 hours, includes evaluation of resources   and insurance to determine [place], [place], LTC, and Medicaid options    -PCP Visit scheduled within three to seven days of discharge       Patient's personal belongings (please select all that are sent with patient):  None    RN SIGNATURE:  Electronically signed by Jose Franco RN on 6/7/21 at 2:56 PM EDT    CASE MANAGEMENT/SOCIAL WORK SECTION    Inpatient Status Date: ***    Readmission Risk Assessment Score:  Readmission Risk              Risk of Unplanned Readmission:  15           Discharging to Facility/ Agency   Name: [address] [place] [address] Drive will call for Appointment  Phone: [phone]  Fax: [phone]    Dialysis Facility (if applicable)   · Name:  · Address:  · Dialysis Schedule:  · Phone:  · Fax:    / signature: {Esignature:[id number]:::0}    PHYSICIAN SECTION    Prognosis: Fair    Condition at Discharge: Stable    Rehab Potential (if transferring to Rehab): Fair    Recommended Labs or Other Treatments After Discharge: F/U w/ PCP in 1 week    Physician Certification: I certify the above information and transfer of Mitch Garcia  is necessary for the continuing treatment of the diagnosis listed and that he requires [address] [facility] [address] for less 30 days.      Update Admission H&P: No change in H&P    PHYSICIAN SIGNATURE:  Electronically signed by Teresita Helton MD on 6/7/21 at 12:10 PM EDT

## 2021-06-07 NOTE — PROGRESS NOTES
Orthostatics completed and reported to Dr Latosha Del Real. Supine /72, HR 92. Sitting BP 96/63, HR 98. Standing BP 73/46, .